# Patient Record
Sex: FEMALE | Race: WHITE | NOT HISPANIC OR LATINO | Employment: OTHER | URBAN - METROPOLITAN AREA
[De-identification: names, ages, dates, MRNs, and addresses within clinical notes are randomized per-mention and may not be internally consistent; named-entity substitution may affect disease eponyms.]

---

## 2017-05-31 ENCOUNTER — HOSPITAL ENCOUNTER (OUTPATIENT)
Dept: OTHER | Facility: HOSPITAL | Age: 32
Discharge: HOME | End: 2017-05-31
Attending: FAMILY MEDICINE | Admitting: FAMILY MEDICINE

## 2017-08-17 ENCOUNTER — CONVERSION ENCOUNTER (OUTPATIENT)
Dept: FAMILY MEDICINE CLINIC | Facility: CLINIC | Age: 32
End: 2017-08-17

## 2017-08-18 LAB
ANA SER QL IA: NEGATIVE
CHROMATIN AB SERPL-ACNC: 7 IU/ML

## 2019-04-26 ENCOUNTER — CONVERSION ENCOUNTER (OUTPATIENT)
Dept: FAMILY MEDICINE CLINIC | Facility: CLINIC | Age: 34
End: 2019-04-26

## 2019-04-26 ENCOUNTER — HOSPITAL ENCOUNTER (OUTPATIENT)
Dept: LAB | Facility: HOSPITAL | Age: 34
Setting detail: SPECIMEN
Discharge: HOME | End: 2019-04-26
Attending: INTERNAL MEDICINE | Admitting: INTERNAL MEDICINE

## 2019-05-23 ENCOUNTER — CONVERSION ENCOUNTER (OUTPATIENT)
Dept: ENDOCRINOLOGY | Facility: CLINIC | Age: 34
End: 2019-05-23

## 2019-06-04 VITALS
HEART RATE: 96 BPM | WEIGHT: 182 LBS | OXYGEN SATURATION: 98 % | BODY MASS INDEX: 32.25 KG/M2 | DIASTOLIC BLOOD PRESSURE: 80 MMHG | SYSTOLIC BLOOD PRESSURE: 110 MMHG | HEIGHT: 63 IN

## 2019-06-04 VITALS
DIASTOLIC BLOOD PRESSURE: 79 MMHG | SYSTOLIC BLOOD PRESSURE: 115 MMHG | HEART RATE: 91 BPM | OXYGEN SATURATION: 99 % | BODY MASS INDEX: 32.43 KG/M2 | WEIGHT: 183 LBS | HEIGHT: 63 IN

## 2019-06-06 NOTE — PROGRESS NOTES
Visit Type:  Follow-up Visit  Referring Provider:  Dr Geronimo   Primary Provider:  none    CC:  thyroid.    History of Present Illness:  33 years old female who presents with thyroid nodule.  She is referred from Dr Savage for hyper thyroid consultation and thyroid nodule.   She has had a history of hyperthyroid for about 2 years but was not able to follow-up due to her job.  She is not   currently taking any thyroid medication.  The patient complains of difficulty swallowing, but denies asymptomatic nodule and localized pain.  Patient denies hoarseness and voice change.    She has a family history of thyroid cancer her paternal   grandmother.    The patient presents with heat intolerance, sweating,mood swings and insomnia and muscle weakness, but denies weight loss, palpitations, tremor, loose bowel movements and visual symptoms.  The patient also notes dry eyes.  Previous evaluation includes lab   studies:, thyroid US: and needle biopsy.      Old records reviewed: Ultrasound 02/2019 showed a right mid lobe thyroid nodule 2.3 x 1.5 cm and left 1.8 x 2.0 cm thyroid nodule biopsy 03/21/2019 left and right were both benign.    labs January 2019 tsh 0.07, free T4 1.3, free T3 3.7.  [facility] PC 5.6 and hemoglobin 15.5, hematocrit 44.1 platelets 311, creatinine 0.75, AST 18 ALT 39.        Standards of Care   no  Last Eye Exam: not sure  Influenza vaccine: 2019  Pneumovax: 2019      Past Medical History:     Reviewed history from 07/25/2016 and no changes required:        Headache, Migraine        Lumbar Disk Disease        Hx of abnormal Paps        thyroid nodule        oropharyngeal dysphagia        eustacian tube dysfunction, bilateral    Past Surgical History:     Reviewed history from 04/26/2019 and no changes required:        Hysterectomy, ovaries remain (2009) for endometriosis        Tubal Ligation        Appendectomy        Cholecystectomy        Carpal Tunnel Right hand         2 nerve release on right  arm    Family History Summary:      Reviewed history Last on 04/26/2019 and no changes required:05/23/2019  MGM - Has Family History of Cervical Cancer - Entered On: 6/25/2015  Mother - Has Family History of Cervical Cancer - Entered On: 6/25/2015  Mother - Has Family History of Diabetes - Entered On: 6/25/2015  Mother - Has Family History of Breast Cancer - Entered On: 6/25/2015    General Comments - FH:  FH Hypertension - Grandmother  FH DM - Grandmother  FH CVA - Grandfather  FH Breast Cancer - Mother    Social History:     Reviewed history from 04/26/2019 and no changes required:                 lucho 2         Employment [employer]         Occupation:         Risk Factors:     Smoked Tobacco Use:  Current every day smoker     Cigarettes:  Yes -- 1/2 pack(s) per day,      Years smoked:  15  Smokeless Tobacco Use:  Never     Counseled to quit/cut down:  yes  Passive smoke exposure:  yes  Drug use:  no  HIV high-risk behavior:  no  Caffeine use:  4+ drinks per day  Alcohol use:  no  Exercise:  no  Seatbelt use:  100 %  Sun Exposure:  occasionally    Family History Risk Factors:     Family History of MI in females < 65 years old:  no     Family History of MI in males < 55 years old:  no    Previous Tobacco Use: Signed On - 04/26/2019  Smoked Tobacco Use:  Current every day smoker     Cigarettes:  Yes -- 1/2 pack(s) per day,      Years smoked:  15  Smokeless Tobacco Use:  Never     Counseled to quit/cut down:  yes  Passive smoke exposure:  yes  Drug use:  no  HIV high-risk behavior:  no  Caffeine use:  4+ drinks per day    Previous Alcohol Use: Signed On - 04/26/2019  Alcohol use:  no  Exercise:  no  Seatbelt use:  100 %  Sun Exposure:  occasionally    Family History Risk Factors:     Family History of MI in females < 65 years old:  no     Family History of MI in males < 55 years old:  no    Current Allergies (reviewed today):  AMOXICILLIN (AMOXICILLIN CAPS) (Critical)  BENADRYL  (Severe)  BICILLIN L-A (PENICILLIN G BENZATHINE) (Moderate)  PENICILLIN G POT IN DEXTROSE (PENICILLIN G POTASSIUM IN D5W) (Moderate)  * LATEX (Moderate)  MORPHINE (Mild)    Current Medications (including medications started today):   METHIMAZOLE 10 MG ORAL TABLET (METHIMAZOLE) one tab daily      Review of Systems     General       Complains of fatigue.       Denies weight loss.    Eyes       Denies double vision and blurring.    CV       Complains of shortness of breath with exertion.       Denies chest pain or discomfort and palpitations.    GI       Denies nausea, vomiting, diarrhea and constipation.           Denies urinary frequency and inability to empty bladder.    MS       Complains of muscle weakness.       Denies muscle cramps and muscle aches.    Derm       Complains of flushing.       Denies dryness, itching and rash.    Neuro       Denies numbness and tingling.    Psych       Denies anxiety and depression.    Endo       Complains of cold intolerance and heat intolerance.      Vital Signs:    Patient Profile:    33 Years Old Female  Height:     62.5 inches  Weight:     182 pounds  BMI:        32.75     O2 Sat:     98 %  Pulse rate: 96 / minute  BP Sittin / 80  (left arm)    Cuff size:  large      Problems: Active problems were reviewed with the patient during this visit.  Medications: Medications were reviewed with the patient during this visit.  Allergies: Allergies were reviewed with the patient during this visit.        Vitals Entered By: Poppy Valadez MA (May 23, 2019 2:39 PM)      Physical Exam    General: Well developed, well nourished, NAD  Eyes: Pink conjunctivae, No ptosis.   Neck: No masses, No thyromegaly, trachea midline.  Lungs: CTA with normal respiratory effort  CV: RRR, no murmur rub or gallop.  Musculoskeletal: Normal gait and station. No digital cyanosis.  Extremities: No clubbing, cyanosis, edema, or deformity.   Neurologic: No focal deficits. Cranial nerves intact.  Skin: Warm  and dry, No lesions or rashes  Psych: AAOx3 with appropraite affect        Blood Pressure:  Today's BP: 110/80 mm Hg          Impression & Recommendations:    Problem # 1:  Hyperthyroidism (ICD-242.90) (BXJ19-O30.90)  Uncontrolled.  1/19 tsh 0.07, free T4 1.3, free T3 3.7.    Repeat TSH 0.07, FT4 1.04, Negative TPO and TSI antibodies.   Uptake 30.2% cold nodule left inferior.    Discussed treatment options including ATD, SARGENT, and surgery.   Side effects of Antithyroid meds d/w her, and possibility  of remission with negative TSI antibodies, and discussed SARGENT in detail.  complications of thyroid surgery including but not limited to   anesthesia risk, infection, pain, bleeding, hypoparathyroidism, hypocalcemia requiring lifelong calcium and vitamin-D supplementation,  damage to the recurrent laryngeal nerve leading to hoarseness, husky voice or loss of voice. She wants to proceed with   surgery, again I had explained to her all the options including radioactive iodine ablation, and thyroid medications and surgery as well as risks and benefit of each option was discussed with the in detail including the complications of surgery as   mentioned above.  She verbalized understanding.  She wants to proceed with surgery.  Will refer to Dr. BIGGS. will add methimazole 10 mg p.o. daily for preop preparation.     Methimazole 10 Mg Oral Tablet (Methimazole) ..... One tab daily    Orders:  [facility] THYROID STIMULATING HORMONE (TSH) (TSH)  [facility] T4 THYROXINE FREE (FT4)  [facility] COMPREHENSIVE METABOLIC PANEL (CMP) (MPC)  [facility] CBC W/DIFF; PATH REVIEW IF INDICATED (CBC)  Surgery Consult (SurgeryOC)  Ofc Vst, Est Level IV (15090)    Her updated medication list for this problem includes:     Methimazole 10 Mg Oral Tablet (Methimazole) ..... One tab daily      Problem # 2:  Thyroid nodule (ICD-241.0) (VLF40-T31.1)  Ultrasound 02/2019 showed a right mid lobe thyroid nodule 2.3 x 1.5 cm and left 1.8 x 2.0 cm thyroid nodule biopsy 03/21/2019 left and  right were both benign.       Orders:  [facility] THYROID STIMULATING HORMONE (TSH) (TSH)  [facility] T4 THYROXINE FREE (FT4)  [facility] COMPREHENSIVE METABOLIC PANEL (CMP) (MPC)  [facility] CBC W/DIFF; PATH REVIEW IF INDICATED (CBC)  Surgery Consult (SurgeryOC)  Ofc Vst, Est Level IV (57458)      Medications Added to Medication List This Visit:  1)  Methimazole 10 Mg Oral Tablet (Methimazole) .... One tab daily      Patient Instructions:  1)  Please schedule a follow-up appointment in 6 weeks.  2)  Be sure to return for lab work one (1) week before your next appointment as scheduled.        Medications:  METHIMAZOLE 10 MG ORAL TABLET (METHIMAZOLE) one tab daily  #30[Tablet] x 3      Entered by: Erin Bernard      Authorized by:  Jeronimo Poe MD      Electronically signed by:   Erin Bernard on 05/23/2019      Method used:    Electronically to               [id number]* (retail)              [address] [address] [place]  [address]              Ph: [phone]              Fax: [phone]      Note to Pharmacy: Route: ORAL;       RxID:   [id number]                Medication Administration    Orders Added:  1)  [facility] THYROID STIMULATING HORMONE (TSH) [TSH]  2)  [facility] T4 THYROXINE FREE [FT4]  3)  [facility] COMPREHENSIVE METABOLIC PANEL (CMP) [MPC]  4)  [facility] CBC W/DIFF; PATH REVIEW IF INDICATED [CBC]  5)  Surgery Consult [SurgeryOC]  6)  Ofc Vst, Est Level IV [71324]  ]      Electronically signed by Jeronimo Poe MD on 05/23/2019 at 3:24 PM  ________________________________________________________________________       Disclaimer: Converted Note message may not contain all data elements that existed in the legacy source system. Please see [employer] for the original note details.

## 2019-06-27 DIAGNOSIS — E04.1 THYROID NODULE: Primary | ICD-10-CM

## 2019-06-27 DIAGNOSIS — E05.90 HYPERTHYROIDISM: ICD-10-CM

## 2019-06-27 PROBLEM — G43.909 HEADACHE, MIGRAINE: Status: ACTIVE | Noted: 2019-06-27

## 2019-06-27 PROBLEM — M54.16 LUMBAR RADICULOPATHY: Status: ACTIVE | Noted: 2019-06-27

## 2019-06-27 PROBLEM — IMO0002 THORACIC OR LUMBOSACRAL NEURITIS OR RADICULITIS: Status: ACTIVE | Noted: 2019-06-27

## 2019-07-08 ENCOUNTER — OFFICE VISIT (OUTPATIENT)
Dept: SURGERY | Facility: CLINIC | Age: 34
End: 2019-07-08

## 2019-07-08 ENCOUNTER — PREP FOR SURGERY (OUTPATIENT)
Dept: OTHER | Facility: HOSPITAL | Age: 34
End: 2019-07-08

## 2019-07-08 VITALS — HEART RATE: 99 BPM | HEIGHT: 63 IN | WEIGHT: 180 LBS | BODY MASS INDEX: 31.89 KG/M2 | OXYGEN SATURATION: 99 %

## 2019-07-08 DIAGNOSIS — E05.90 HYPERTHYROIDISM: ICD-10-CM

## 2019-07-08 DIAGNOSIS — E05.90 HYPERTHYROIDISM: Primary | ICD-10-CM

## 2019-07-08 DIAGNOSIS — E04.1 THYROID NODULE: Primary | ICD-10-CM

## 2019-07-08 PROBLEM — M51.36 DEGENERATION OF LUMBAR INTERVERTEBRAL DISC: Status: ACTIVE | Noted: 2018-12-18

## 2019-07-08 PROBLEM — M51.369 DEGENERATION OF LUMBAR INTERVERTEBRAL DISC: Status: ACTIVE | Noted: 2018-12-18

## 2019-07-08 PROBLEM — M47.816 LUMBAR SPONDYLOSIS: Status: ACTIVE | Noted: 2017-08-31

## 2019-07-08 PROCEDURE — 99243 OFF/OP CNSLTJ NEW/EST LOW 30: CPT | Performed by: SURGERY

## 2019-07-08 RX ORDER — ACETAMINOPHEN 500 MG
1000 TABLET ORAL ONCE
Status: CANCELLED | OUTPATIENT
Start: 2019-07-29 | End: 2019-07-08

## 2019-07-08 RX ORDER — SODIUM CHLORIDE 0.9 % (FLUSH) 0.9 %
3 SYRINGE (ML) INJECTION EVERY 12 HOURS SCHEDULED
Status: CANCELLED | OUTPATIENT
Start: 2019-07-29

## 2019-07-08 RX ORDER — SODIUM CHLORIDE 0.9 % (FLUSH) 0.9 %
3-10 SYRINGE (ML) INJECTION AS NEEDED
Status: CANCELLED | OUTPATIENT
Start: 2019-07-29

## 2019-07-08 RX ORDER — ONDANSETRON 2 MG/ML
8 INJECTION INTRAMUSCULAR; INTRAVENOUS EVERY 6 HOURS PRN
Status: CANCELLED | OUTPATIENT
Start: 2019-07-08

## 2019-07-08 RX ORDER — OXYCODONE HCL 10 MG/1
10 TABLET, FILM COATED, EXTENDED RELEASE ORAL ONCE
Status: CANCELLED | OUTPATIENT
Start: 2019-07-29 | End: 2019-07-08

## 2019-07-08 RX ORDER — CLINDAMYCIN PHOSPHATE 900 MG/50ML
900 INJECTION INTRAVENOUS ONCE
Status: CANCELLED | OUTPATIENT
Start: 2019-07-29 | End: 2019-07-08

## 2019-07-29 ENCOUNTER — APPOINTMENT (OUTPATIENT)
Dept: PREADMISSION TESTING | Facility: HOSPITAL | Age: 34
End: 2019-07-29

## 2019-07-29 VITALS
BODY MASS INDEX: 31.36 KG/M2 | WEIGHT: 177 LBS | HEIGHT: 63 IN | DIASTOLIC BLOOD PRESSURE: 74 MMHG | RESPIRATION RATE: 16 BRPM | SYSTOLIC BLOOD PRESSURE: 109 MMHG | HEART RATE: 91 BPM | OXYGEN SATURATION: 99 % | TEMPERATURE: 98.4 F

## 2019-07-29 DIAGNOSIS — E05.90 HYPERTHYROIDISM: ICD-10-CM

## 2019-07-29 LAB
ABO GROUP BLD: NORMAL
ANION GAP SERPL CALCULATED.3IONS-SCNC: 16.3 MMOL/L (ref 5–15)
BLD GP AB SCN SERPL QL: NEGATIVE
BUN BLD-MCNC: 10 MG/DL (ref 6–20)
BUN/CREAT SERPL: 13.2 (ref 7–25)
CALCIUM SPEC-SCNC: 9.3 MG/DL (ref 8.6–10.5)
CALCIUM SPEC-SCNC: 9.4 MG/DL (ref 8.6–10.5)
CHLORIDE SERPL-SCNC: 105 MMOL/L (ref 98–107)
CO2 SERPL-SCNC: 19.7 MMOL/L (ref 22–29)
CREAT BLD-MCNC: 0.76 MG/DL (ref 0.57–1)
GFR SERPL CREATININE-BSD FRML MDRD: 86 ML/MIN/1.73
GLUCOSE BLD-MCNC: 89 MG/DL (ref 65–99)
POTASSIUM BLD-SCNC: 3.5 MMOL/L (ref 3.5–5.2)
PTH-INTACT SERPL-MCNC: 57.2 PG/ML (ref 15–65)
RH BLD: POSITIVE
SODIUM BLD-SCNC: 141 MMOL/L (ref 136–145)
T&S EXPIRATION DATE: NORMAL

## 2019-07-29 PROCEDURE — 80048 BASIC METABOLIC PNL TOTAL CA: CPT | Performed by: SURGERY

## 2019-07-29 PROCEDURE — 83970 ASSAY OF PARATHORMONE: CPT | Performed by: SURGERY

## 2019-07-29 PROCEDURE — 86850 RBC ANTIBODY SCREEN: CPT | Performed by: SURGERY

## 2019-07-29 PROCEDURE — 86901 BLOOD TYPING SEROLOGIC RH(D): CPT | Performed by: SURGERY

## 2019-07-29 PROCEDURE — 36415 COLL VENOUS BLD VENIPUNCTURE: CPT

## 2019-07-29 PROCEDURE — 86900 BLOOD TYPING SEROLOGIC ABO: CPT | Performed by: SURGERY

## 2019-07-29 NOTE — DISCHARGE INSTRUCTIONS
Take the following medications the morning of surgery with a small sip of water:  NONE    Arrive to hospital on your day of surgery at 6:00 AM.      General Instructions:  • Do not eat solid food after midnight the night before surgery.  • You may drink clear liquids day of surgery but must stop at least one hour before your hospital arrival time.  • It is beneficial for you to have a clear drink that contains carbohydrates the day of surgery.  We suggest a 12 to 20 ounce bottle of Gatorade or Powerade for non-diabetic patients or a 12 to 20 ounce bottle of G2 or Powerade Zero for diabetic patients. (Pediatric patients, are not advised to drink a 12 to 20 ounce carbohydrate drink)    Clear liquids are liquids you can see through.  Nothing red in color.     Plain water                               Sports drinks  Sodas                                   Gelatin (Jell-O)  Fruit juices without pulp such as white grape juice and apple juice  Popsicles that contain no fruit or yogurt  Tea or coffee (no cream or milk added)  Gatorade / Powerade  G2 / Powerade Zero    • Infants may have breast milk up to four hours before surgery.  • Infants drinking formula may drink formula up to six hours before surgery.   • Patients who avoid smoking, chewing tobacco and alcohol for 4 weeks prior to surgery have a reduced risk of post-operative complications.  Quit smoking as many days before surgery as you can.  • Do not smoke, use chewing tobacco or drink alcohol the day of surgery.   • If applicable bring your C-PAP/ BI-PAP machine.  • Bring any papers given to you in the doctor’s office.  • Wear clean comfortable clothes and socks.  • Do not wear contact lenses, false eyelashes or make-up.  Bring a case for your glasses.   • Bring crutches or walker if applicable.  • Remove all piercings.  Leave jewelry and any other valuables at home.  • Hair extensions with metal clips must be removed prior to surgery.  • The Pre-Admission Testing  nurse will instruct you to bring medications if unable to obtain an accurate list in Pre-Admission Testing.        If you were given a blood bank ID arm band remember to bring it with you the day of surgery.    Preventing a Surgical Site Infection:  • For 2 to 3 days before surgery, avoid shaving with a razor because the razor can irritate skin and make it easier to develop an infection.    • Any areas of open skin can increase the risk of a post-operative wound infection by allowing bacteria to enter and travel throughout the body.  Notify your surgeon if you have any skin wounds / rashes even if it is not near the expected surgical site.  The area will need assessed to determine if surgery should be delayed until it is healed.  • The night prior to surgery sleep in a clean bed with clean clothing.  Do not allow pets to sleep with you.  • Shower on the morning of surgery using a fresh bar of anti-bacterial soap (such as Dial) and clean washcloth.  Dry with a clean towel and dress in clean clothing.  • Ask your surgeon if you will be receiving antibiotics prior to surgery.  • Make sure you, your family, and all healthcare providers clean their hands with soap and water or an alcohol based hand  before caring for you or your wound.    Day of surgery:  Upon arrival, a Pre-op nurse and Anesthesiologist will review your health history, obtain vital signs, and answer questions you may have.  The only belongings needed at this time will be a list of your home medications and if applicable your C-PAP/BI-PAP machine.  If you are staying overnight your family can leave the rest of your belongings in the car and bring them to your room later.  A Pre-op nurse will start an IV and you may receive medication in preparation for surgery, including something to help you relax.  Your family will be able to see you in the Pre-op area.  While you are in surgery your family should notify the waiting room  if they  leave the waiting room area and provide a contact phone number.    Please be aware that surgery does come with discomfort.  We want to make every effort to control your discomfort so please discuss any uncontrolled symptoms with your nurse.   Your doctor will most likely have prescribed pain medications.      If you are going home after surgery you will receive individualized written care instructions before being discharged.  A responsible adult must drive you to and from the hospital on the day of your surgery and stay with you for 24 hours.    If you are staying overnight following surgery, you will be transported to your hospital room following the recovery period.  [facility] has all private rooms.    You have received a list of surgical assistants for your reference.  If you have any questions please call Pre-Admission Testing at [phone].  Deductibles and co-payments are collected on the day of service. Please be prepared to pay the required co-pay, deductible or deposit on the day of service as defined by your plan.    2% CHLORAHEXIDINE GLUCONATE* CLOTH  Preparing or “prepping” skin before surgery can reduce the risk of infection at the surgical site. To make the process easier, [facility] has chosen disposable cloths moistened with a rinse-free, 2% Chlorhexidine Gluconate (CHG) antiseptic solution. The steps below outline the prepping process and should be carefully followed.        Use the prep cloth on the area that is circled in the diagram             Directions Night before Surgery  1) Shower using a fresh bar of anti-bacterial soap (such as Dial) and clean washcloth.  Use a clean towel to completely dry your skin.  2) Do not use any lotions, oils or creams on your skin.  3) Open the package and remove 1 cloth, wipe your skin for 30 seconds in a circular motion.  Allow to dry for 3 minutes.  4) Repeat #3 with second cloth.  5) Do not touch your eyes, ears, or mouth with  the prep cloth.  6) Allow the wet prep solution to air dry.  7) Discard the prep cloth and wash your hands with soap and water.   8) Dress in clean bed clothes and sleep on fresh clean bed sheets.   9) You may experience some temporary itching after the prep.    Directions Day of Surgery  1) Repeat steps 1,2,3,4,5,6,7, and 9.   2) Dress in clean clothes before coming to the hospital.

## 2019-08-07 ENCOUNTER — ANESTHESIA (OUTPATIENT)
Dept: PERIOP | Facility: HOSPITAL | Age: 34
End: 2019-08-07

## 2019-08-07 ENCOUNTER — HOSPITAL ENCOUNTER (OUTPATIENT)
Facility: HOSPITAL | Age: 34
Discharge: HOME | End: 2019-08-08
Attending: SURGERY | Admitting: SURGERY

## 2019-08-07 ENCOUNTER — ANESTHESIA EVENT (OUTPATIENT)
Dept: PERIOP | Facility: HOSPITAL | Age: 34
End: 2019-08-07

## 2019-08-07 DIAGNOSIS — E05.90 HYPERTHYROIDISM: ICD-10-CM

## 2019-08-07 LAB
PTH-INTACT SERPL-SCNC: 67.9 PG/ML (ref 15–65)
TSH SERPL DL<=0.05 MIU/L-ACNC: 0.4 MIU/ML (ref 0.27–4.2)

## 2019-08-07 PROCEDURE — 88307 TISSUE EXAM BY PATHOLOGIST: CPT | Performed by: SURGERY

## 2019-08-07 PROCEDURE — 25010000002 HYDROMORPHONE PER 4 MG: Performed by: NURSE ANESTHETIST, CERTIFIED REGISTERED

## 2019-08-07 PROCEDURE — 60240 REMOVAL OF THYROID: CPT | Performed by: PHYSICIAN ASSISTANT

## 2019-08-07 PROCEDURE — 25010000002 PROPOFOL 10 MG/ML EMULSION: Performed by: NURSE ANESTHETIST, CERTIFIED REGISTERED

## 2019-08-07 PROCEDURE — 25010000002 FENTANYL CITRATE (PF) 100 MCG/2ML SOLUTION: Performed by: NURSE ANESTHETIST, CERTIFIED REGISTERED

## 2019-08-07 PROCEDURE — 60240 REMOVAL OF THYROID: CPT | Performed by: SURGERY

## 2019-08-07 PROCEDURE — 83970 ASSAY OF PARATHORMONE: CPT | Performed by: SURGERY

## 2019-08-07 PROCEDURE — 25010000002 PROMETHAZINE PER 50 MG: Performed by: SURGERY

## 2019-08-07 PROCEDURE — 84443 ASSAY THYROID STIM HORMONE: CPT | Performed by: SURGERY

## 2019-08-07 PROCEDURE — 25010000002 ONDANSETRON PER 1 MG: Performed by: SURGERY

## 2019-08-07 PROCEDURE — 25010000002 ONDANSETRON PER 1 MG: Performed by: NURSE ANESTHETIST, CERTIFIED REGISTERED

## 2019-08-07 PROCEDURE — 25010000002 DEXAMETHASONE PER 1 MG: Performed by: NURSE ANESTHETIST, CERTIFIED REGISTERED

## 2019-08-07 PROCEDURE — 25010000002 MIDAZOLAM PER 1 MG: Performed by: ANESTHESIOLOGY

## 2019-08-07 DEVICE — CLIP LIGAT VASC HORIZON TI SM/WD RED 24CT: Type: IMPLANTABLE DEVICE | Site: NECK | Status: FUNCTIONAL

## 2019-08-07 DEVICE — CLIP LIGAT VASC HORIZON TI MD BLU 24CT: Type: IMPLANTABLE DEVICE | Site: NECK | Status: FUNCTIONAL

## 2019-08-07 RX ORDER — SODIUM CHLORIDE 0.9 % (FLUSH) 0.9 %
3-10 SYRINGE (ML) INJECTION AS NEEDED
Status: DISCONTINUED | OUTPATIENT
Start: 2019-08-07 | End: 2019-08-07 | Stop reason: HOSPADM

## 2019-08-07 RX ORDER — MIDAZOLAM HYDROCHLORIDE 1 MG/ML
1 INJECTION INTRAMUSCULAR; INTRAVENOUS
Status: DISCONTINUED | OUTPATIENT
Start: 2019-08-07 | End: 2019-08-07 | Stop reason: HOSPADM

## 2019-08-07 RX ORDER — PROMETHAZINE HYDROCHLORIDE 25 MG/1
25 SUPPOSITORY RECTAL ONCE AS NEEDED
Status: DISCONTINUED | OUTPATIENT
Start: 2019-08-07 | End: 2019-08-07 | Stop reason: HOSPADM

## 2019-08-07 RX ORDER — MEPERIDINE HYDROCHLORIDE 25 MG/ML
12.5 INJECTION INTRAMUSCULAR; INTRAVENOUS; SUBCUTANEOUS
Status: DISCONTINUED | OUTPATIENT
Start: 2019-08-07 | End: 2019-08-07 | Stop reason: HOSPADM

## 2019-08-07 RX ORDER — CLINDAMYCIN PHOSPHATE 900 MG/50ML
900 INJECTION INTRAVENOUS ONCE
Status: COMPLETED | OUTPATIENT
Start: 2019-08-07 | End: 2019-08-07

## 2019-08-07 RX ORDER — PROMETHAZINE HYDROCHLORIDE 25 MG/ML
12.5 INJECTION, SOLUTION INTRAMUSCULAR; INTRAVENOUS ONCE AS NEEDED
Status: DISCONTINUED | OUTPATIENT
Start: 2019-08-07 | End: 2019-08-07 | Stop reason: HOSPADM

## 2019-08-07 RX ORDER — PROPOFOL 10 MG/ML
VIAL (ML) INTRAVENOUS AS NEEDED
Status: DISCONTINUED | OUTPATIENT
Start: 2019-08-07 | End: 2019-08-07 | Stop reason: SURG

## 2019-08-07 RX ORDER — ACETAMINOPHEN 500 MG
1000 TABLET ORAL ONCE
Status: COMPLETED | OUTPATIENT
Start: 2019-08-07 | End: 2019-08-07

## 2019-08-07 RX ORDER — PROMETHAZINE HYDROCHLORIDE 25 MG/ML
12.5 INJECTION, SOLUTION INTRAMUSCULAR; INTRAVENOUS EVERY 6 HOURS PRN
Status: DISCONTINUED | OUTPATIENT
Start: 2019-08-07 | End: 2019-08-08 | Stop reason: HOSPADM

## 2019-08-07 RX ORDER — ACETAMINOPHEN 325 MG/1
650 TABLET ORAL ONCE AS NEEDED
Status: DISCONTINUED | OUTPATIENT
Start: 2019-08-07 | End: 2019-08-07 | Stop reason: HOSPADM

## 2019-08-07 RX ORDER — ACETAMINOPHEN 650 MG/1
650 SUPPOSITORY RECTAL EVERY 4 HOURS PRN
Status: DISCONTINUED | OUTPATIENT
Start: 2019-08-07 | End: 2019-08-08 | Stop reason: HOSPADM

## 2019-08-07 RX ORDER — NALOXONE HCL 0.4 MG/ML
0.1 VIAL (ML) INJECTION
Status: DISCONTINUED | OUTPATIENT
Start: 2019-08-07 | End: 2019-08-08 | Stop reason: HOSPADM

## 2019-08-07 RX ORDER — FENTANYL CITRATE 50 UG/ML
INJECTION, SOLUTION INTRAMUSCULAR; INTRAVENOUS AS NEEDED
Status: DISCONTINUED | OUTPATIENT
Start: 2019-08-07 | End: 2019-08-07 | Stop reason: SURG

## 2019-08-07 RX ORDER — ACETAMINOPHEN 325 MG/1
650 TABLET ORAL EVERY 4 HOURS PRN
Status: DISCONTINUED | OUTPATIENT
Start: 2019-08-07 | End: 2019-08-08 | Stop reason: HOSPADM

## 2019-08-07 RX ORDER — CHLORHEXIDINE GLUCONATE 4 G/100ML
SOLUTION TOPICAL DAILY PRN
COMMUNITY
End: 2019-08-08 | Stop reason: HOSPADM

## 2019-08-07 RX ORDER — ONDANSETRON 2 MG/ML
4 INJECTION INTRAMUSCULAR; INTRAVENOUS ONCE AS NEEDED
Status: DISCONTINUED | OUTPATIENT
Start: 2019-08-07 | End: 2019-08-07 | Stop reason: HOSPADM

## 2019-08-07 RX ORDER — ACETAMINOPHEN 160 MG/5ML
650 SOLUTION ORAL EVERY 4 HOURS PRN
Status: DISCONTINUED | OUTPATIENT
Start: 2019-08-07 | End: 2019-08-08 | Stop reason: HOSPADM

## 2019-08-07 RX ORDER — SODIUM CHLORIDE 0.9 % (FLUSH) 0.9 %
3 SYRINGE (ML) INJECTION EVERY 12 HOURS SCHEDULED
Status: DISCONTINUED | OUTPATIENT
Start: 2019-08-07 | End: 2019-08-07 | Stop reason: HOSPADM

## 2019-08-07 RX ORDER — PROMETHAZINE HYDROCHLORIDE 25 MG/ML
6.25 INJECTION, SOLUTION INTRAMUSCULAR; INTRAVENOUS
Status: DISCONTINUED | OUTPATIENT
Start: 2019-08-07 | End: 2019-08-07 | Stop reason: HOSPADM

## 2019-08-07 RX ORDER — ONDANSETRON 2 MG/ML
INJECTION INTRAMUSCULAR; INTRAVENOUS AS NEEDED
Status: DISCONTINUED | OUTPATIENT
Start: 2019-08-07 | End: 2019-08-07 | Stop reason: SURG

## 2019-08-07 RX ORDER — HYDROMORPHONE HYDROCHLORIDE 1 MG/ML
0.5 INJECTION, SOLUTION INTRAMUSCULAR; INTRAVENOUS; SUBCUTANEOUS
Status: DISCONTINUED | OUTPATIENT
Start: 2019-08-07 | End: 2019-08-07 | Stop reason: HOSPADM

## 2019-08-07 RX ORDER — FENTANYL CITRATE 50 UG/ML
50 INJECTION, SOLUTION INTRAMUSCULAR; INTRAVENOUS
Status: DISCONTINUED | OUTPATIENT
Start: 2019-08-07 | End: 2019-08-07 | Stop reason: HOSPADM

## 2019-08-07 RX ORDER — MAGNESIUM HYDROXIDE 1200 MG/15ML
LIQUID ORAL AS NEEDED
Status: DISCONTINUED | OUTPATIENT
Start: 2019-08-07 | End: 2019-08-07 | Stop reason: HOSPADM

## 2019-08-07 RX ORDER — ALBUTEROL SULFATE 2.5 MG/3ML
2.5 SOLUTION RESPIRATORY (INHALATION) ONCE AS NEEDED
Status: DISCONTINUED | OUTPATIENT
Start: 2019-08-07 | End: 2019-08-07 | Stop reason: HOSPADM

## 2019-08-07 RX ORDER — HYDROMORPHONE HYDROCHLORIDE 1 MG/ML
0.5 INJECTION, SOLUTION INTRAMUSCULAR; INTRAVENOUS; SUBCUTANEOUS
Status: DISCONTINUED | OUTPATIENT
Start: 2019-08-07 | End: 2019-08-08 | Stop reason: HOSPADM

## 2019-08-07 RX ORDER — OXYCODONE AND ACETAMINOPHEN 10; 325 MG/1; MG/1
1 TABLET ORAL EVERY 4 HOURS PRN
Status: DISCONTINUED | OUTPATIENT
Start: 2019-08-07 | End: 2019-08-08 | Stop reason: HOSPADM

## 2019-08-07 RX ORDER — DEXAMETHASONE SODIUM PHOSPHATE 10 MG/ML
INJECTION INTRAMUSCULAR; INTRAVENOUS AS NEEDED
Status: DISCONTINUED | OUTPATIENT
Start: 2019-08-07 | End: 2019-08-07 | Stop reason: SURG

## 2019-08-07 RX ORDER — LABETALOL HYDROCHLORIDE 5 MG/ML
5 INJECTION, SOLUTION INTRAVENOUS
Status: DISCONTINUED | OUTPATIENT
Start: 2019-08-07 | End: 2019-08-07 | Stop reason: HOSPADM

## 2019-08-07 RX ORDER — HYDRALAZINE HYDROCHLORIDE 20 MG/ML
5 INJECTION INTRAMUSCULAR; INTRAVENOUS
Status: DISCONTINUED | OUTPATIENT
Start: 2019-08-07 | End: 2019-08-07 | Stop reason: HOSPADM

## 2019-08-07 RX ORDER — HYDROCODONE BITARTRATE AND ACETAMINOPHEN 7.5; 325 MG/1; MG/1
1 TABLET ORAL ONCE AS NEEDED
Status: DISCONTINUED | OUTPATIENT
Start: 2019-08-07 | End: 2019-08-07 | Stop reason: HOSPADM

## 2019-08-07 RX ORDER — MIDAZOLAM HYDROCHLORIDE 1 MG/ML
2 INJECTION INTRAMUSCULAR; INTRAVENOUS
Status: DISCONTINUED | OUTPATIENT
Start: 2019-08-07 | End: 2019-08-07 | Stop reason: HOSPADM

## 2019-08-07 RX ORDER — FLUMAZENIL 0.1 MG/ML
0.2 INJECTION INTRAVENOUS AS NEEDED
Status: DISCONTINUED | OUTPATIENT
Start: 2019-08-07 | End: 2019-08-07 | Stop reason: HOSPADM

## 2019-08-07 RX ORDER — BUPIVACAINE HYDROCHLORIDE AND EPINEPHRINE 5; 5 MG/ML; UG/ML
INJECTION, SOLUTION PERINEURAL AS NEEDED
Status: DISCONTINUED | OUTPATIENT
Start: 2019-08-07 | End: 2019-08-07 | Stop reason: HOSPADM

## 2019-08-07 RX ORDER — ONDANSETRON 4 MG/1
4 TABLET, FILM COATED ORAL EVERY 6 HOURS PRN
Status: DISCONTINUED | OUTPATIENT
Start: 2019-08-07 | End: 2019-08-08 | Stop reason: HOSPADM

## 2019-08-07 RX ORDER — LIDOCAINE HYDROCHLORIDE 20 MG/ML
INJECTION, SOLUTION INFILTRATION; PERINEURAL AS NEEDED
Status: DISCONTINUED | OUTPATIENT
Start: 2019-08-07 | End: 2019-08-07 | Stop reason: SURG

## 2019-08-07 RX ORDER — ROCURONIUM BROMIDE 10 MG/ML
INJECTION, SOLUTION INTRAVENOUS AS NEEDED
Status: DISCONTINUED | OUTPATIENT
Start: 2019-08-07 | End: 2019-08-07 | Stop reason: SURG

## 2019-08-07 RX ORDER — FAMOTIDINE 10 MG/ML
20 INJECTION, SOLUTION INTRAVENOUS ONCE
Status: COMPLETED | OUTPATIENT
Start: 2019-08-07 | End: 2019-08-07

## 2019-08-07 RX ORDER — NALOXONE HCL 0.4 MG/ML
0.2 VIAL (ML) INJECTION AS NEEDED
Status: DISCONTINUED | OUTPATIENT
Start: 2019-08-07 | End: 2019-08-07 | Stop reason: HOSPADM

## 2019-08-07 RX ORDER — SODIUM CHLORIDE, SODIUM LACTATE, POTASSIUM CHLORIDE, CALCIUM CHLORIDE 600; 310; 30; 20 MG/100ML; MG/100ML; MG/100ML; MG/100ML
100 INJECTION, SOLUTION INTRAVENOUS CONTINUOUS
Status: DISCONTINUED | OUTPATIENT
Start: 2019-08-07 | End: 2019-08-08 | Stop reason: HOSPADM

## 2019-08-07 RX ORDER — HYDROMORPHONE HCL 110MG/55ML
PATIENT CONTROLLED ANALGESIA SYRINGE INTRAVENOUS AS NEEDED
Status: DISCONTINUED | OUTPATIENT
Start: 2019-08-07 | End: 2019-08-07 | Stop reason: SURG

## 2019-08-07 RX ORDER — OXYCODONE HCL 10 MG/1
10 TABLET, FILM COATED, EXTENDED RELEASE ORAL ONCE
Status: COMPLETED | OUTPATIENT
Start: 2019-08-07 | End: 2019-08-07

## 2019-08-07 RX ORDER — HYDROCODONE BITARTRATE AND ACETAMINOPHEN 5; 325 MG/1; MG/1
1 TABLET ORAL EVERY 4 HOURS PRN
Status: DISCONTINUED | OUTPATIENT
Start: 2019-08-07 | End: 2019-08-08 | Stop reason: HOSPADM

## 2019-08-07 RX ORDER — ONDANSETRON 2 MG/ML
4 INJECTION INTRAMUSCULAR; INTRAVENOUS EVERY 6 HOURS PRN
Status: DISCONTINUED | OUTPATIENT
Start: 2019-08-07 | End: 2019-08-08 | Stop reason: HOSPADM

## 2019-08-07 RX ORDER — SODIUM CHLORIDE, SODIUM LACTATE, POTASSIUM CHLORIDE, CALCIUM CHLORIDE 600; 310; 30; 20 MG/100ML; MG/100ML; MG/100ML; MG/100ML
9 INJECTION, SOLUTION INTRAVENOUS CONTINUOUS
Status: DISCONTINUED | OUTPATIENT
Start: 2019-08-07 | End: 2019-08-07

## 2019-08-07 RX ORDER — PROMETHAZINE HYDROCHLORIDE 25 MG/1
25 TABLET ORAL ONCE AS NEEDED
Status: DISCONTINUED | OUTPATIENT
Start: 2019-08-07 | End: 2019-08-07 | Stop reason: HOSPADM

## 2019-08-07 RX ADMIN — FENTANYL CITRATE 50 MCG: 50 INJECTION, SOLUTION INTRAMUSCULAR; INTRAVENOUS at 10:30

## 2019-08-07 RX ADMIN — ONDANSETRON 4 MG: 2 INJECTION INTRAMUSCULAR; INTRAVENOUS at 09:29

## 2019-08-07 RX ADMIN — SODIUM CHLORIDE, POTASSIUM CHLORIDE, SODIUM LACTATE AND CALCIUM CHLORIDE: 600; 310; 30; 20 INJECTION, SOLUTION INTRAVENOUS at 09:44

## 2019-08-07 RX ADMIN — HYDROCODONE BITARTRATE AND ACETAMINOPHEN 1 TABLET: 5; 325 TABLET ORAL at 20:46

## 2019-08-07 RX ADMIN — FENTANYL CITRATE 100 MCG: 50 INJECTION INTRAMUSCULAR; INTRAVENOUS at 08:07

## 2019-08-07 RX ADMIN — PROMETHAZINE HYDROCHLORIDE 12.5 MG: 25 INJECTION INTRAMUSCULAR; INTRAVENOUS at 17:02

## 2019-08-07 RX ADMIN — HYDROMORPHONE HYDROCHLORIDE 0.5 MG: 1 INJECTION, SOLUTION INTRAMUSCULAR; INTRAVENOUS; SUBCUTANEOUS at 11:32

## 2019-08-07 RX ADMIN — CALCIUM CARBONATE-VITAMIN D TAB 500 MG-200 UNIT 1000 MG: 500-200 TAB at 20:05

## 2019-08-07 RX ADMIN — CLINDAMYCIN PHOSPHATE 900 MG: 900 INJECTION INTRAVENOUS at 08:21

## 2019-08-07 RX ADMIN — OXYCODONE HYDROCHLORIDE 10 MG: 10 TABLET, FILM COATED, EXTENDED RELEASE ORAL at 06:25

## 2019-08-07 RX ADMIN — FENTANYL CITRATE 50 MCG: 50 INJECTION INTRAMUSCULAR; INTRAVENOUS at 08:50

## 2019-08-07 RX ADMIN — DEXAMETHASONE SODIUM PHOSPHATE 8 MG: 10 INJECTION INTRAMUSCULAR; INTRAVENOUS at 08:20

## 2019-08-07 RX ADMIN — ACETAMINOPHEN 1000 MG: 500 TABLET, FILM COATED ORAL at 06:25

## 2019-08-07 RX ADMIN — LIDOCAINE HYDROCHLORIDE 100 MG: 20 INJECTION, SOLUTION INFILTRATION; PERINEURAL at 08:07

## 2019-08-07 RX ADMIN — HYDROMORPHONE HYDROCHLORIDE 0.5 MG: 1 INJECTION, SOLUTION INTRAMUSCULAR; INTRAVENOUS; SUBCUTANEOUS at 12:05

## 2019-08-07 RX ADMIN — FENTANYL CITRATE 50 MCG: 50 INJECTION, SOLUTION INTRAMUSCULAR; INTRAVENOUS at 11:10

## 2019-08-07 RX ADMIN — HYDROMORPHONE HYDROCHLORIDE 1 MG: 2 INJECTION INTRAMUSCULAR; INTRAVENOUS; SUBCUTANEOUS at 10:00

## 2019-08-07 RX ADMIN — MIDAZOLAM 2 MG: 1 INJECTION INTRAMUSCULAR; INTRAVENOUS at 07:12

## 2019-08-07 RX ADMIN — SODIUM CHLORIDE, POTASSIUM CHLORIDE, SODIUM LACTATE AND CALCIUM CHLORIDE 9 ML/HR: 600; 310; 30; 20 INJECTION, SOLUTION INTRAVENOUS at 06:13

## 2019-08-07 RX ADMIN — FAMOTIDINE 20 MG: 10 INJECTION INTRAVENOUS at 07:12

## 2019-08-07 RX ADMIN — ROCURONIUM BROMIDE 40 MG: 10 INJECTION INTRAVENOUS at 08:07

## 2019-08-07 RX ADMIN — PROPOFOL 200 MG: 10 INJECTION, EMULSION INTRAVENOUS at 08:07

## 2019-08-07 RX ADMIN — ONDANSETRON 4 MG: 2 INJECTION INTRAMUSCULAR; INTRAVENOUS at 13:22

## 2019-08-07 NOTE — OP NOTE
Operative Note  Thyroid  08/07/19      Pre-op Diagnosis:   · Hyperthyroidism, with bilateral thyroid nodules, FNA benign, on tapazole  · BMI 32  · Smoker  · Expected post op calcium drop    Post-op Diagnosis:  · same    Procedure:   · Total thyroidectomy    Surgeon: Kandy    Assistant: shabnam    Indications:  · Nice 34 year old patient of Dr Poe with bilateral thyroid nodules and hyperthyroidism    Associated Issues:  · She was advised to stop smoking pre op but opted not to and smoked the morning of the procedure.  She had been notified of the increased risks of coughing post op with subsequent bleed and wound  Healing difficulties.  · Pre op calcium: 9.3  · Pre op PTH: 57.2    Findings:   · Intraoperative STAT PTH preoperatively 67.2  · Gross findings:  No abnormalities grossly    Recommendations:   · Overnight stay    EBL: <50 ml    Technique:     General anesthetic was induced.  IV cleocin was given.  The neck was extended, and then prepped with Hibiclens and draped sterilely.    The neck was examined and the skin creases evaluated to determine the best location for the incision, attempting to maximize both operative exposure and post op cosmesis.  I selected a low location, with a fairly good crease on the right, not so much on the left, and marked.  She had another that was quite superior that was more dramatic, but I felt like this was too high.    1/2% Marcaine with epinephrine was used to inject the site.  Incision was made thru the skin and subcutaneous space and then completed with cautery to the cervical fascia.  Flaps were then raised with the facelift retractors and cautery, directly on the anterior aspect of the fascia, both superiorly and inferiorly.  The strap muscles were then divided in the midline.    Dissection was then begun under the strap muscle on the left side.  The bipolar on 15 was used.  Her thyroid tended to rotate back posteriorly fairly dramatically.  It also was fairly high or  cephalad in its location.  I dissected out the inferior pole, took down the diminutive vessel with a small clip and divided.  As I came up on the lateral aspect, identified a parathyroid and pressed that backwards.  Identified the nerve deeply, and it had a very tiny attachment that went around the nerve up to the thyroid that I basically just cut off to allow removal, and it ultimately quit bleeding on its own.  This was only one branch of the nerve, a larger branch being more posterior.  I then teased the remaining portion of the nerve off of the posterior aspect of the thyroid, it being extremely tethered.  I then continued up to the superior pole, dissected out, placed large clips across it, and divided with the bipolar.  I then teased the superior portion more medially off of the cricopharyngeus, identified the nerve and pulled the thyroid towards the midline.  This completely loosened it.    I went to the opposite side and did similarly.  On the right side, the superior pole went up considerably more superiorly and had to place 3 separate sets of clips for bundles going to the thyroid.  The nerve on the right was considerably larger.  It was easily identified and avoided.  A large vessel came around it was clipped quite far off of it up towards the thyroid.  The inferior pole had a more obvious vasculature as well and was clipped with large clips.  Both parathyroids were identified and pulled back laterally.  Once both sides were completely loose, I took it off the central portion with the bipolar.    We examined both sides and the nerve was intact on both sides, 2 parathyroids with Definity on the right, one on the left.  I suspected that the inferior parathyroid on the left was in some fatty tissue which had not been disturbed at the more inferior aspect of the neck.  I had stayed well off the cricopharyngeus, do my dissection right on the thyroid to avoid injury to the superior laryngeal nerve.    I  irrigated both sides and suctioned to make sure that all was clear, and used the cautery on the clips at the superior aspect on the right..  Surgicel was placed.    The wound was then closed with a running 3-0 Vicryl to the midline strap muscles, followed by interrupted 3-0 vicryl to the platysma, interrupted 3-0 vicryl to the subcutaneous, and running 5-0 vicryl to the skin.  Dermabond was applied to the wound.    Court Gaitan MD  08/07/19  10:21 AM

## 2019-08-07 NOTE — ANESTHESIA PREPROCEDURE EVALUATION
Anesthesia Evaluation     no history of anesthetic complications:               Airway   Mallampati: I  TM distance: >3 FB  Neck ROM: full  Dental    (+) upper dentures    Pulmonary    (+) a smoker Current Smoked day of surgery,   (-) asthma, shortness of breath, recent URI  Cardiovascular     (-) hypertension      Neuro/Psych  GI/Hepatic/Renal/Endo    (-) liver disease, no renal disease, diabetes    Musculoskeletal     Abdominal    Substance History      OB/GYN          Other                        Anesthesia Plan    ASA 2     general     intravenous induction   Anesthetic plan, all risks, benefits, and alternatives have been provided, discussed and informed consent has been obtained with: patient.

## 2019-08-07 NOTE — ANESTHESIA POSTPROCEDURE EVALUATION
"Patient: Iva Cameron    Procedure Summary     Date:  08/07/19 Room / Location:  [facility] OR 02 / [facility] MAIN OR    Anesthesia Start:  0805 Anesthesia Stop:  1003    Procedure:  total thyroidectomy (Bilateral Neck) Diagnosis:       Hyperthyroidism      (Hyperthyroidism [E05.90])    Surgeon:  Court Gaitan MD Provider:  Fiona Nick MD    Anesthesia Type:  general ASA Status:  2          Anesthesia Type: general  Last vitals  BP   141/87 (08/07/19 1130)   Temp   36.8 °C (98.3 °F) (08/07/19 1000)   Pulse   71 (08/07/19 1130)   Resp   14 (08/07/19 1130)     SpO2   100 % (08/07/19 1130)     Post Anesthesia Care and Evaluation    Patient location during evaluation: bedside  Patient participation: complete - patient participated  Level of consciousness: sleepy but conscious  Pain score: 0  Pain management: adequate  Airway patency: patent  Anesthetic complications: No anesthetic complications    Cardiovascular status: acceptable  Respiratory status: acceptable  Hydration status: acceptable    Comments: /87   Pulse 71   Temp 36.8 °C (98.3 °F) (Oral)   Resp 14   Ht 160 cm (63\")   Wt 79.4 kg (175 lb 2 oz)   SpO2 100%   BMI 31.02 kg/m²         "

## 2019-08-07 NOTE — ANESTHESIA PROCEDURE NOTES
Airway  Urgency: elective    Airway not difficult    General Information and Staff    Patient location during procedure: OR  Anesthesiologist: Fiona Nick MD  CRNA: Tamy Peraza CRNA    Indications and Patient Condition  Indications for airway management: airway protection    Preoxygenated: yes (pt pre-O2 with 100% O2)  Mask difficulty assessment: 2 - vent by mask + OA or adjuvant +/- NMBA (easy BMV )    Final Airway Details  Final airway type: endotracheal airway      Successful airway: ETT  Cuffed: yes   Successful intubation technique: direct laryngoscopy  Endotracheal tube insertion site: oral  Blade: Nery  Blade size: 3  ETT size (mm): 7.0  Cormack-Lehane Classification: grade I - full view of glottis  Placement verified by: chest auscultation and capnometry   Cuff volume (mL): 7  Measured from: lips  ETT to lips (cm): 21  Number of attempts at approach: 1    Additional Comments  Patient upper dentures removed after induction and before DL and placed in a denture cup. ATOETx1. No change in dentition.

## 2019-08-07 NOTE — ANESTHESIA POSTPROCEDURE EVALUATION
"Patient: Iva Cameron    Procedure Summary     Date:  08/07/19 Room / Location:  [facility] OR 02 / [facility] MAIN OR    Anesthesia Start:  0805 Anesthesia Stop:  1003    Procedure:  total thyroidectomy (Bilateral Neck) Diagnosis:       Hyperthyroidism      (Hyperthyroidism [E05.90])    Surgeon:  Court Gaitan MD Provider:  Fiona Nick MD    Anesthesia Type:  general ASA Status:  2          Anesthesia Type: general  Last vitals  BP   109/73 (08/07/19 1245)   Temp   36.9 °C (98.4 °F) (08/07/19 1245)   Pulse   67 (08/07/19 1245)   Resp   16 (08/07/19 1245)     SpO2   96 % (08/07/19 1245)     Post Anesthesia Care and Evaluation    Patient location during evaluation: bedside  Patient participation: complete - patient participated  Level of consciousness: awake and alert  Pain management: adequate  Airway patency: patent  Anesthetic complications: No anesthetic complications    Cardiovascular status: acceptable  Respiratory status: acceptable  Hydration status: acceptable    Comments: /73   Pulse 67   Temp 36.9 °C (98.4 °F) (Oral)   Resp 16   Ht 160 cm (63\")   Wt 79.4 kg (175 lb 2 oz)   SpO2 96%   BMI 31.02 kg/m²       "

## 2019-08-08 VITALS
OXYGEN SATURATION: 100 % | RESPIRATION RATE: 18 BRPM | SYSTOLIC BLOOD PRESSURE: 111 MMHG | HEIGHT: 63 IN | BODY MASS INDEX: 31.03 KG/M2 | WEIGHT: 175.13 LBS | HEART RATE: 75 BPM | TEMPERATURE: 98.2 F | DIASTOLIC BLOOD PRESSURE: 76 MMHG

## 2019-08-08 LAB
CALCIUM SPEC-SCNC: 8.8 MG/DL (ref 8.6–10.5)
PTH-INTACT SERPL-MCNC: 20.9 PG/ML (ref 15–65)

## 2019-08-08 PROCEDURE — 82310 ASSAY OF CALCIUM: CPT | Performed by: SURGERY

## 2019-08-08 PROCEDURE — 83970 ASSAY OF PARATHORMONE: CPT | Performed by: SURGERY

## 2019-08-08 RX ORDER — HYDROCODONE BITARTRATE AND ACETAMINOPHEN 5; 325 MG/1; MG/1
1 TABLET ORAL EVERY 4 HOURS PRN
Qty: 16 TABLET | Refills: 0 | Status: SHIPPED | OUTPATIENT
Start: 2019-08-08 | End: 2019-08-14

## 2019-08-08 RX ORDER — ONDANSETRON 4 MG/1
4 TABLET, FILM COATED ORAL EVERY 8 HOURS PRN
Qty: 20 TABLET | Refills: 0 | Status: SHIPPED | OUTPATIENT
Start: 2019-08-08 | End: 2019-08-14

## 2019-08-08 RX ORDER — LEVOTHYROXINE SODIUM 0.07 MG/1
75 TABLET ORAL DAILY
Qty: 30 TABLET | Refills: 11 | Status: SHIPPED | OUTPATIENT
Start: 2019-08-08 | End: 2019-08-12

## 2019-08-08 RX ADMIN — CALCIUM CARBONATE-VITAMIN D TAB 500 MG-200 UNIT 1000 MG: 500-200 TAB at 08:37

## 2019-08-08 RX ADMIN — HYDROCODONE BITARTRATE AND ACETAMINOPHEN 1 TABLET: 5; 325 TABLET ORAL at 09:04

## 2019-08-08 RX ADMIN — HYDROCODONE BITARTRATE AND ACETAMINOPHEN 1 TABLET: 5; 325 TABLET ORAL at 04:47

## 2019-08-08 NOTE — PROGRESS NOTES
Discharge Planning Assessment  [facility]     Patient Name: Iva Cameron  MRN: [id number]  Today's Date: 8/8/2019    Admit Date: 8/7/2019      Discharge Plan     Row Name 08/08/19 0822       Plan    Plan Comments  Discharge order, no discharge needs identified.    Final Discharge Disposition Code  01 - home or self-care     Expected Discharge Date and Time     Expected Discharge Date Expected Discharge Time    Aug 8, 2019      Keya Flynn RN

## 2019-08-08 NOTE — PLAN OF CARE
Problem: Patient Care Overview  Goal: Plan of Care Review  Outcome: Ongoing (interventions implemented as appropriate)   08/08/19 0340   Coping/Psychosocial   Plan of Care Reviewed With patient   Plan of Care Review   Progress improving   OTHER   Outcome Summary VSS. Pain well controlled. Incison site clean and intact. No c/o of tingling sensation.  Regular food tolerated. Oral Fluid intake large amount with large amount of urine output. IV Fluids discontinued. Ambulated in the yee. Slept well.     Goal: Individualization and Mutuality  Outcome: Ongoing (interventions implemented as appropriate)    Goal: Discharge Needs Assessment  Outcome: Ongoing (interventions implemented as appropriate)    Goal: Interprofessional Rounds/Family Conf  Outcome: Ongoing (interventions implemented as appropriate)      Problem: Pain, Acute (Adult)  Goal: Identify Related Risk Factors and Signs and Symptoms  Outcome: Ongoing (interventions implemented as appropriate)    Goal: Acceptable Pain Control/Comfort Level  Outcome: Ongoing (interventions implemented as appropriate)

## 2019-08-09 ENCOUNTER — TELEPHONE (OUTPATIENT)
Dept: SURGERY | Facility: CLINIC | Age: 34
End: 2019-08-09

## 2019-08-09 LAB
LAB AP CASE REPORT: NORMAL
LAB AP DIAGNOSIS COMMENT: NORMAL
LAB AP SYNOPTIC CHECKLIST: NORMAL
PATH REPORT.FINAL DX SPEC: NORMAL
PATH REPORT.GROSS SPEC: NORMAL

## 2019-08-09 NOTE — TELEPHONE ENCOUNTER
Dr Scott from pathology called regarding this patients path report. Had total Thyroidectomy 8/7/19. She stated it did show Carcinoma. Also said if you have any questions you can reach her at [phone]. Pt  has P.O. appt with you on 8/14.

## 2019-08-12 DIAGNOSIS — E05.90 HYPERTHYROIDISM: ICD-10-CM

## 2019-08-12 DIAGNOSIS — E04.1 THYROID NODULE: Primary | ICD-10-CM

## 2019-08-12 RX ORDER — LEVOTHYROXINE SODIUM 0.12 MG/1
125 TABLET ORAL DAILY
Qty: 30 TABLET | Refills: 4 | Status: SHIPPED | OUTPATIENT
Start: 2019-08-12 | End: 2019-10-10

## 2019-08-14 ENCOUNTER — OFFICE VISIT (OUTPATIENT)
Dept: SURGERY | Facility: CLINIC | Age: 34
End: 2019-08-14

## 2019-08-14 DIAGNOSIS — C73 THYROID CANCER (HCC): Primary | ICD-10-CM

## 2019-08-14 PROCEDURE — 99024 POSTOP FOLLOW-UP VISIT: CPT | Performed by: SURGERY

## 2019-08-14 RX ORDER — CLINDAMYCIN HYDROCHLORIDE 300 MG/1
300 CAPSULE ORAL 3 TIMES DAILY
Qty: 15 CAPSULE | Refills: 0 | Status: SHIPPED | OUTPATIENT
Start: 2019-08-14 | End: 2019-08-19

## 2019-08-14 RX ORDER — METAXALONE 800 MG/1
800 TABLET ORAL 2 TIMES DAILY
Qty: 28 TABLET | Refills: 0 | Status: SHIPPED | OUTPATIENT
Start: 2019-08-14 | End: 2019-08-26 | Stop reason: ALTCHOICE

## 2019-08-14 RX ORDER — HYDROCODONE BITARTRATE AND ACETAMINOPHEN 5; 325 MG/1; MG/1
1 TABLET ORAL EVERY 4 HOURS PRN
Qty: 16 TABLET | Refills: 0 | Status: SHIPPED | OUTPATIENT
Start: 2019-08-14 | End: 2019-08-19

## 2019-08-14 NOTE — PROGRESS NOTES
SURGERY: VY  Post op Visit  Iva Cameron  08/14/19    Patient presents today after having undergone Surgery 8/7/2019, of a total thyroidectomy.  This was done for hyperthyroidism with bilateral thyroid nodules, FNA benign.  Unfortunately she did not stop smoking as requested and in fact smoked on the morning of surgery.  I counseled her prior to surgery on that day that this increased her risk of perioperative wound problems.    She comes in today and is having some redness and swelling at her incision.  She said the redness is progressively getting worse.  She also is developing hoarseness which she did not have postoperatively until about 2 days ago.    There is some puffiness in the area and I really would not worry about that in general except for her symptoms and the redness.  Based on that I am going to start her on clindamycin since she is allergic to penicillin.  I also gave her prescription for hydrocodone at her request, and Skelaxin for posterior neck pain.    I gave her the information about her papillary cancer.  I told her that I felt like we probably had completely cured and she would not need anything else but she should follow-up with Dr. Jeronimo Poe regarding that.      I will see her back next week.    Court Gaitan MD

## 2019-08-19 ENCOUNTER — OFFICE VISIT (OUTPATIENT)
Dept: SURGERY | Facility: CLINIC | Age: 34
End: 2019-08-19

## 2019-08-19 DIAGNOSIS — C73 THYROID CANCER (HCC): Primary | ICD-10-CM

## 2019-08-19 PROCEDURE — 99024 POSTOP FOLLOW-UP VISIT: CPT | Performed by: SURGERY

## 2019-08-20 NOTE — PROGRESS NOTES
SURGERY: VY  Post op Visit  Iva Cameron  08/19/19    Patient presents today after having undergone Surgery 8/7/2019, of a total thyroidectomy.  This was done for hyperthyroidism with bilateral thyroid nodules, FNA benign.  Unfortunately she did not stop smoking as requested and in fact smoked on the morning of surgery.  I counseled her prior to surgery on that day that this increased her risk of perioperative wound problems.    She came in last week with some redness and swelling at the incision complaining of hoarseness.  I started her on clindamycin, and today the redness is completely gone, hoarseness gone.  She does request that I extend her time off.  She believes the Skelaxin helped with her neck pain and the hydrocodone.    We discussed again the information about her papillary cancer.  I told her that I felt like we probably had completely cured and she would not need anything else but she should follow-up with Dr. Jeronimo Poe regarding that.      I will see her as needed.      Court Gaitan MD

## 2019-08-22 ENCOUNTER — TELEPHONE (OUTPATIENT)
Dept: ENDOCRINOLOGY | Facility: CLINIC | Age: 34
End: 2019-08-22

## 2019-08-22 NOTE — TELEPHONE ENCOUNTER
She should be on Os-Agustin D5 500/200, 1 tablet 3 times a day and check CMP.  If the symptoms of numbness and tingling continue that she can take 4 tablets of Os-Agustin D.

## 2019-08-22 NOTE — TELEPHONE ENCOUNTER
Patient called and stated she has pins and needle feeling in her legs. She has been experiencing it for the last 2 days. She stated when she sits down it is worse. She had her thyroid taken out on the 7th of August and she found it was cancerous. Dr. Gaitan told the patient to call us if she felt any pins and needles. Please advise.

## 2019-08-23 RX ORDER — CHOLECALCIFEROL (VITAMIN D3) 50 MCG
2000 TABLET ORAL DAILY
Qty: 30 TABLET | Refills: 3 | Status: SHIPPED | OUTPATIENT
Start: 2019-08-23 | End: 2019-12-30

## 2019-08-23 NOTE — TELEPHONE ENCOUNTER
Patient notified. She is at [facility] right now and getting her labs done. She already had a CMP ordered. She is coming in next week for her appointment. Rx sent. [facility] will send us her lab results.

## 2019-08-26 ENCOUNTER — OFFICE VISIT (OUTPATIENT)
Dept: ENDOCRINOLOGY | Facility: CLINIC | Age: 34
End: 2019-08-26

## 2019-08-26 VITALS
OXYGEN SATURATION: 98 % | DIASTOLIC BLOOD PRESSURE: 70 MMHG | HEIGHT: 63 IN | WEIGHT: 182 LBS | SYSTOLIC BLOOD PRESSURE: 115 MMHG | HEART RATE: 92 BPM | BODY MASS INDEX: 32.25 KG/M2

## 2019-08-26 DIAGNOSIS — E83.51 HYPOCALCEMIA: ICD-10-CM

## 2019-08-26 DIAGNOSIS — E89.0 POSTOPERATIVE HYPOTHYROIDISM: ICD-10-CM

## 2019-08-26 DIAGNOSIS — C73 THYROID CANCER (HCC): Primary | ICD-10-CM

## 2019-08-26 PROBLEM — E05.90 HYPERTHYROIDISM: Status: RESOLVED | Noted: 2019-04-26 | Resolved: 2019-08-26

## 2019-08-26 PROCEDURE — 99214 OFFICE O/P EST MOD 30 MIN: CPT | Performed by: INTERNAL MEDICINE

## 2019-08-26 NOTE — PROGRESS NOTES
Endocrine Progress Note Outpatient     Patient Care Team:  Provider, No Known as PCP - General  Provider, No Known as PCP - Family Medicine    Chief Complaint: Follow-up hyperthyroidism    HPI: 34-year-old female who was seen here on May 2019 for thyroid nodule and hyperthyroidism.  Her work-up showed an uptake of 30.2% with a cold not in the left side.  Her thyroid ultrasound showed a right mid thyroid lobe nodule at 2.3 cm in the left 1.8 cm nodule.  Biopsy of both right and the left nodules were benign in March 2019.  As part of the treatment after discussions with all options including surgery versus antithyroid medication radioactive iodine treatment she chose to proceed with surgery and saw Dr. Court Walton.  She underwent total thyroidectomy on August 7, 2019.  Pathology showed a 5 mm classic papillary thyroid cancer with margins were uninvolved.  She is currently on levothyroxine 125 mcg p.o. daily.  She is also on calcium supplementation.  She feels cold all the time.  She did have some finger tingling which is better after calcium supplementation she still has some tingling in the legs.  She is taking her calcium and vitamin D along with thyroid pills on a regular basis.    Past Medical History:   Diagnosis Date   • Arthritis    • History of abnormal cervical Pap smear    • History of migraine     AS A CHILD   • History of MRSA infection     UNDER LEFT GLUTEAL FOLD, TREATED AT [facility] IN [place], APPROX. 7 YEARS AGO   • Hyperthyroidism    • Lumbar disc disease    • Oropharyngeal dysphagia    • Papillary microcarcinoma of thyroid (CMS/[facility]) 08/09/2019    s/p Total Thyroidectomy   • Thyroid nodule        Social History     Socioeconomic History   • Marital status:      Spouse name: Not on file   • Number of children: 2   • Years of education: Not on file   • Highest education level: GED or equivalent   Occupational History   • Occupation:      Employer: [email or web address]   Tobacco Use   • Smoking status: Current Every Day Smoker     Packs/day: 0.50     Years: 20.00     Pack years: 10.00   • Smokeless tobacco: Never Used   Substance and Sexual Activity   • Alcohol use: No     Frequency: Never   • Drug use: No   • Sexual activity: Defer       Family History   Problem Relation Age of Onset   • Breast cancer Mother    • Diabetes Mother    • Cervical cancer Mother    • Cervical cancer Maternal Grandmother    • Diabetes Other         grandmother   • Hypertension Other         grandmother   • Stroke Other         grandfather   • Malig Hyperthermia Neg Hx        Allergies   Allergen Reactions   • Penicillins Anaphylaxis     ALL 'CILLIN' FAMILY   • Gabapentin GI Intolerance   • Latex Rash   • Tramadol GI Intolerance   • Diphenhydramine Hives and Itching   • Morphine Hives       ROS:   Constitutional:  Denies fatigue, tiredness.    Eyes:  Denies change in visual acuity   HENT:  Denies nasal congestion or sore throat   Respiratory: denies cough, shortness of breath.   Cardiovascular:  denies chest pain, edema   GI:  Denies abdominal pain, nausea, vomiting.   Musculoskeletal:  Denies back pain or joint pain   Integument:  Denies dry skin and rash   Neurologic:  Denies headache, focal weakness or sensory changes   Endocrine:  Denies polyuria or polydipsia   Psychiatric:  Denies depression or anxiety      Vitals:    08/26/19 1548   BP: 115/70   Pulse: 92   SpO2: 98%       Physical Exam:  GEN: NAD, conversant  EYES: EOMI, PERRL, no conjunctival erythema  NECK: no thyromegaly, full ROM   CV: RRR, no murmurs/rubs/gallops, no peripheral edema  LUNG: CTAB, no wheezes/rales/ronchi  SKIN: no rashes, no acanthosis  MSK: no deformities, full ROM of all extremities  NEURO: no tremors, DTR normal  PSYCH: AOX3, appropriate mood, affect normal      Results Review:     I reviewed the patient's new clinical results.    No results found for: HGBA1C   Lab Results   Component Value Date     GLUCOSE 89 07/29/2019    BUN 10 07/29/2019    CREATININE 0.76 07/29/2019    EGFRIFNONA 86 07/29/2019    EGFRIFAFRI 113 05/11/2016    BCR 13.2 07/29/2019    K 3.5 07/29/2019    CO2 19.7 (L) 07/29/2019    CALCIUM 8.8 08/08/2019    ALBUMIN 4.1 05/11/2016    AST 21 05/11/2016    ALT 36 (H) 05/11/2016     Lab Results   Component Value Date    TSH 0.401 08/07/2019    FREET4 1.1 05/24/2016     Tissue Pathology Exam: [id number]   Order: [id number]   Collected:  8/7/2019 09:23   Status:  Final result   Visible to patient:  No (Not Released)   Dx:  Hyperthyroidism   Component    Final Diagnosis   1. Thyroid, Total Thyroidectomy (49 grams):               A. PAPILLARY MICROCARCINOMA, CLASSIC TYPE, measuring 0.5 cm (see Synoptic Report and Comment).               B. Margins are negative for carcinoma; Closest distance: 1 mm from posterior margin.                C. Negative for vascular and lymphatic space invasion.               D. Negative for extrathyroidal extension.               E. Background thyroid with benign adenomatoid nodules (largest is 1.5 cm in greatest dimension) and                   associated procedure-related changes.    Electronically signed by Mary Lou Scott MD on 8/9/2019 at 0857               Medication Review: Reviewed.       Current Outpatient Medications:   •  calcium-vitamin D (OSCAL 500/200 D-3) 500-200 MG-UNIT per tablet, Take 1 tablet by mouth 2 (Two) Times a Day., Disp: 180 tablet, Rfl: 3  •  Cholecalciferol (VITAMIN D) 2000 units tablet, Take 2,000 Units by mouth Daily., Disp: 30 tablet, Rfl: 3  •  levothyroxine (SYNTHROID) 125 MCG tablet, Take 1 tablet by mouth Daily., Disp: 30 tablet, Rfl: 4      Assessment/Plan   Papillary microcarcinoma of thyroid: Status post total thyroidectomy.  Given that the tumor was only 5 mm and things were clear, I do not think that she needs radioactive iodine treatment at this time.  Risk for recurrence is low.  Stage I disease.  Hypothyroidism secondary to total  "thyroidectomy: On levothyroxine supplementation, will follow TSH and free T4 and make further recommendations as needed.  She tells us that she had done some labs, we have been trying to get it from the [facility] lab without any success.  I will continue levothyroxine and follow labs in 4 weeks.  Hypocalcemia: She is currently on calcium with vitamin D supplementation, will follow CMP.  Her PTH level was normal at 20.9 one day after the surgery.            Jeronimo Poe MD FACE.  06/15/19  4:34 PM      EMR Dragon / transcription disclaimer:     \"Dictated utilizing Dragon dictation\".                 "

## 2019-09-03 ENCOUNTER — TELEPHONE (OUTPATIENT)
Dept: ENDOCRINOLOGY | Facility: CLINIC | Age: 34
End: 2019-09-03

## 2019-09-03 NOTE — TELEPHONE ENCOUNTER
Her TSH, free T4, calcium level were normal.  Done on August 23, 2019.  She can follow with her family physician for anxiety.

## 2019-09-03 NOTE — TELEPHONE ENCOUNTER
Patient called and stated she has her surgery on 08/07/19. She stated she is having more anxiety than she anticipated and wants to know if there is anything she can do to help with this. She is also requesting her most recent lab results. Please advise.

## 2019-09-26 ENCOUNTER — RESULTS ENCOUNTER (OUTPATIENT)
Dept: ENDOCRINOLOGY | Facility: CLINIC | Age: 34
End: 2019-09-26

## 2019-09-26 DIAGNOSIS — E83.51 HYPOCALCEMIA: ICD-10-CM

## 2019-09-26 DIAGNOSIS — C73 THYROID CANCER (HCC): ICD-10-CM

## 2019-09-26 DIAGNOSIS — E89.0 POSTOPERATIVE HYPOTHYROIDISM: ICD-10-CM

## 2019-10-08 LAB
ALBUMIN SERPL-MCNC: 4.4 G/DL (ref 3.5–5.5)
ALBUMIN/GLOB SERPL: 1.9 {RATIO} (ref 1.2–2.2)
ALP SERPL-CCNC: 67 IU/L (ref 39–117)
ALT SERPL-CCNC: 37 IU/L (ref 0–32)
AST SERPL-CCNC: 27 IU/L (ref 0–40)
BILIRUB SERPL-MCNC: <0.2 MG/DL (ref 0–1.2)
BUN SERPL-MCNC: 15 MG/DL (ref 6–20)
BUN/CREAT SERPL: 18 (ref 9–23)
CALCIUM SERPL-MCNC: 9.3 MG/DL (ref 8.7–10.2)
CHLORIDE SERPL-SCNC: 104 MMOL/L (ref 96–106)
CO2 SERPL-SCNC: 26 MMOL/L (ref 20–29)
CREAT SERPL-MCNC: 0.82 MG/DL (ref 0.57–1)
GLOBULIN SER CALC-MCNC: 2.3 G/DL (ref 1.5–4.5)
GLUCOSE SERPL-MCNC: 73 MG/DL (ref 65–99)
POTASSIUM SERPL-SCNC: 4 MMOL/L (ref 3.5–5.2)
PROT SERPL-MCNC: 6.7 G/DL (ref 6–8.5)
PTH-INTACT SERPL-MCNC: 23 PG/ML (ref 15–65)
SODIUM SERPL-SCNC: 143 MMOL/L (ref 134–144)
T4 FREE SERPL-MCNC: 1.26 NG/DL (ref 0.82–1.77)
TSH SERPL DL<=0.005 MIU/L-ACNC: 6.01 UIU/ML (ref 0.45–4.5)

## 2019-10-10 DIAGNOSIS — E89.0 POSTOPERATIVE HYPOTHYROIDISM: ICD-10-CM

## 2019-10-10 DIAGNOSIS — C73 THYROID CANCER (HCC): Primary | ICD-10-CM

## 2019-10-10 RX ORDER — LEVOTHYROXINE SODIUM 137 UG/1
137 TABLET ORAL DAILY
Qty: 30 TABLET | Refills: 3 | Status: SHIPPED | OUTPATIENT
Start: 2019-10-10 | End: 2020-01-20

## 2019-11-21 ENCOUNTER — RESULTS ENCOUNTER (OUTPATIENT)
Dept: ENDOCRINOLOGY | Facility: CLINIC | Age: 34
End: 2019-11-21

## 2019-11-21 DIAGNOSIS — C73 THYROID CANCER (HCC): ICD-10-CM

## 2019-11-21 DIAGNOSIS — E89.0 POSTOPERATIVE HYPOTHYROIDISM: ICD-10-CM

## 2019-12-08 LAB
T4 FREE SERPL-MCNC: 1.07 NG/DL (ref 0.82–1.77)
TSH SERPL DL<=0.005 MIU/L-ACNC: 1.39 UIU/ML (ref 0.45–4.5)

## 2019-12-30 RX ORDER — CHOLECALCIFEROL (VITAMIN D3) 50 MCG
TABLET ORAL
Qty: 30 TABLET | Refills: 3 | Status: SHIPPED | OUTPATIENT
Start: 2019-12-30 | End: 2020-05-22

## 2020-01-13 ENCOUNTER — OFFICE VISIT (OUTPATIENT)
Dept: ENDOCRINOLOGY | Facility: CLINIC | Age: 35
End: 2020-01-13

## 2020-01-13 VITALS
HEART RATE: 104 BPM | WEIGHT: 189 LBS | OXYGEN SATURATION: 98 % | SYSTOLIC BLOOD PRESSURE: 110 MMHG | HEIGHT: 63 IN | BODY MASS INDEX: 33.49 KG/M2 | DIASTOLIC BLOOD PRESSURE: 70 MMHG

## 2020-01-13 DIAGNOSIS — C73 THYROID CANCER (HCC): Primary | ICD-10-CM

## 2020-01-13 DIAGNOSIS — E89.0 POSTOPERATIVE HYPOTHYROIDISM: ICD-10-CM

## 2020-01-13 DIAGNOSIS — E83.51 HYPOCALCEMIA: ICD-10-CM

## 2020-01-13 PROCEDURE — 99214 OFFICE O/P EST MOD 30 MIN: CPT | Performed by: INTERNAL MEDICINE

## 2020-01-13 NOTE — PROGRESS NOTES
Endocrine Progress Note Outpatient     Patient Care Team:  Provider, No Known as PCP - General  Provider, No Known as PCP - Family Medicine    Chief Complaint: Follow-up hyperthyroidism    HPI: 34-year-old female who was seen here on May 2019 for thyroid nodule and hyperthyroidism.  Her work-up showed an uptake of 30.2% with a cold not in the left side.  Her thyroid ultrasound showed a right mid thyroid lobe nodule at 2.3 cm in the left 1.8 cm nodule.  Biopsy of both right and the left nodules were benign in March 2019.  As part of the treatment after discussions with all options including surgery versus antithyroid medication radioactive iodine treatment she chose to proceed with surgery and saw Dr. Court Gaitan.  She underwent total thyroidectomy on August 7, 2019.  Pathology showed a 5 mm classic papillary thyroid cancer with margins were uninvolved.  She is currently on levothyroxine 137 mcg p.o. daily.  She is also on calcium supplementation.  She still feels cold all the time.  She did have some finger tingling which is better after calcium supplementation she still has some tingling in the legs.  She is taking her calcium and vitamin D along with thyroid pills on a regular basis.    Past Medical History:   Diagnosis Date   • Arthritis    • History of abnormal cervical Pap smear    • History of migraine     AS A CHILD   • History of MRSA infection     UNDER LEFT GLUTEAL FOLD, TREATED AT [facility] IN [place], APPROX. 7 YEARS AGO   • Hyperthyroidism    • Lumbar disc disease    • Neck pain    • Oropharyngeal dysphagia    • Papillary microcarcinoma of thyroid (CMS/[facility]) 08/09/2019    s/p Total Thyroidectomy   • Thyroid nodule        Social History     Socioeconomic History   • Marital status:      Spouse name: Not on file   • Number of children: 2   • Years of education: Not on file   • Highest education level: GED or equivalent   Occupational History   • Occupation:       Employer: [employer]   Tobacco Use   • Smoking status: Current Every Day Smoker     Packs/day: 0.50     Years: 20.00     Pack years: 10.00   • Smokeless tobacco: Never Used   Substance and Sexual Activity   • Alcohol use: No     Frequency: Never   • Drug use: No   • Sexual activity: Defer       Family History   Problem Relation Age of Onset   • Breast cancer Mother    • Diabetes Mother    • Cervical cancer Mother    • Cervical cancer Maternal Grandmother    • Diabetes Other         grandmother   • Hypertension Other         grandmother   • Stroke Other         grandfather   • Malig Hyperthermia Neg Hx        Allergies   Allergen Reactions   • Penicillins Anaphylaxis     ALL 'CILLIN' FAMILY   • Gabapentin GI Intolerance   • Latex Rash   • Tramadol GI Intolerance   • Diphenhydramine Hives and Itching   • Morphine Hives       ROS:   Constitutional:  Admit fatigue, tiredness.    Eyes:  Denies change in visual acuity   HENT:  Denies nasal congestion or sore throat   Respiratory: denies cough, admit shortness of breath.   Cardiovascular:  denies chest pain, edema   GI:  Denies abdominal pain, nausea, vomiting.   Musculoskeletal:  Denies back pain or joint pain   Integument:  Denies dry skin and rash   Neurologic:  Denies headache, focal weakness or sensory changes   Endocrine:  Denies polyuria or polydipsia   Psychiatric:  Admit depression, denies anxiety      Vitals:    01/13/20 1456   BP: 110/70   Pulse: 104   SpO2: 98%       Physical Exam:  GEN: NAD, conversant  EYES: EOMI, PERRL, no conjunctival erythema  NECK: no thyromegaly, full ROM   CV: RRR, no murmurs/rubs/gallops, no peripheral edema  LUNG: CTAB, no wheezes/rales/ronchi  SKIN: no rashes, no acanthosis  MSK: no deformities, full ROM of all extremities  NEURO: no tremors, DTR normal  PSYCH: AOX3, appropriate mood, affect normal      Results Review:     I reviewed the patient's new clinical results.      Lab Results   Component Value Date     GLUCOSE 89 07/29/2019    BUN 15 10/07/2019    CREATININE 0.82 10/07/2019    EGFRIFNONA 92 10/07/2019    EGFRIFAFRI 106 10/07/2019    BCR 18 10/07/2019    K 4.0 10/07/2019    CO2 26 10/07/2019    CALCIUM 9.3 10/07/2019    PROTENTOTREF 6.7 10/07/2019    ALBUMIN 4.4 10/07/2019    LABIL2 1.9 10/07/2019    AST 27 10/07/2019    ALT 37 (H) 10/07/2019     Lab Results   Component Value Date    TSH 1.390 12/07/2019    FREET4 1.07 12/07/2019     Tissue Pathology Exam: [id number]   Order: [id number]   Collected:  8/7/2019 09:23   Status:  Final result   Visible to patient:  No (Not Released)   Dx:  Hyperthyroidism   Component    Final Diagnosis   1. Thyroid, Total Thyroidectomy (49 grams):               A. PAPILLARY MICROCARCINOMA, CLASSIC TYPE, measuring 0.5 cm (see Synoptic Report and Comment).               B. Margins are negative for carcinoma; Closest distance: 1 mm from posterior margin.                C. Negative for vascular and lymphatic space invasion.               D. Negative for extrathyroidal extension.               E. Background thyroid with benign adenomatoid nodules (largest is 1.5 cm in greatest dimension) and                   associated procedure-related changes.    Electronically signed by Mary Lou Scott MD on 8/9/2019 at 0857               Medication Review: Reviewed.       Current Outpatient Medications:   •  calcium-vitamin D (OSCAL 500/200 D-3) 500-200 MG-UNIT per tablet, Take 1 tablet by mouth 2 (Two) Times a Day., Disp: 180 tablet, Rfl: 3  •  Cholecalciferol (VITAMIN D) 50 MCG (2000 UT) tablet, TAKE 1 TABLET BY MOUTH DAILY., Disp: 30 tablet, Rfl: 3  •  levothyroxine (SYNTHROID, LEVOTHROID) 137 MCG tablet, Take 1 tablet by mouth Daily., Disp: 30 tablet, Rfl: 3      Assessment/Plan   1.  Papillary microcarcinoma of thyroid: Status post total thyroidectomy.  Given that the tumor was only 5 mm and things were clear, I do not think that she needs radioactive iodine treatment at this time.  Risk for  "recurrence is low.  Stage I disease.  Will check TG panel.    2.  Hypothyroidism secondary to total thyroidectomy: On levothyroxine supplementation, will follow TSH and free T4 and make further recommendations as needed.  She tells us that she had done some labs, we have been trying to get it from the [facility] lab without any success.  I will continue levothyroxine and follow labs in 4 weeks.      3. Hypocalcemia: She is currently on calcium with vitamin D supplementation, will follow CMP.  Her PTH level was normal.             Jeronimo Poe MD FACE.  06/15/19  4:34 PM      EMR Dragon / transcription disclaimer:     \"Dictated utilizing Dragon dictation\".                 "

## 2020-01-17 LAB
ALBUMIN SERPL-MCNC: 4.6 G/DL (ref 3.5–5.5)
ALBUMIN/GLOB SERPL: 2.2 {RATIO} (ref 1.2–2.2)
ALP SERPL-CCNC: 61 IU/L (ref 39–117)
ALT SERPL-CCNC: 26 IU/L (ref 0–32)
AST SERPL-CCNC: 23 IU/L (ref 0–40)
BILIRUB SERPL-MCNC: 0.4 MG/DL (ref 0–1.2)
BUN SERPL-MCNC: 15 MG/DL (ref 6–20)
BUN/CREAT SERPL: 16 (ref 9–23)
CALCIUM SERPL-MCNC: 9.5 MG/DL (ref 8.7–10.2)
CHLORIDE SERPL-SCNC: 105 MMOL/L (ref 96–106)
CO2 SERPL-SCNC: 24 MMOL/L (ref 20–29)
CREAT SERPL-MCNC: 0.96 MG/DL (ref 0.57–1)
GLOBULIN SER CALC-MCNC: 2.1 G/DL (ref 1.5–4.5)
GLUCOSE SERPL-MCNC: 67 MG/DL (ref 65–99)
POTASSIUM SERPL-SCNC: 4.2 MMOL/L (ref 3.5–5.2)
PROT SERPL-MCNC: 6.7 G/DL (ref 6–8.5)
SODIUM SERPL-SCNC: 142 MMOL/L (ref 134–144)
T4 FREE SERPL-MCNC: 1.17 NG/DL (ref 0.82–1.77)
THYROGLOB AB SERPL-ACNC: <1 IU/ML
THYROGLOB SERPL-MCNC: 0.1 NG/ML
THYROGLOB SERPL-MCNC: NORMAL NG/ML
TSH SERPL DL<=0.005 MIU/L-ACNC: 4.85 UIU/ML (ref 0.45–4.5)

## 2020-01-20 DIAGNOSIS — E89.0 POSTOPERATIVE HYPOTHYROIDISM: Primary | ICD-10-CM

## 2020-01-20 DIAGNOSIS — C73 THYROID CANCER (HCC): ICD-10-CM

## 2020-01-20 RX ORDER — LEVOTHYROXINE SODIUM 0.15 MG/1
150 TABLET ORAL DAILY
Qty: 30 TABLET | Refills: 5 | Status: SHIPPED | OUTPATIENT
Start: 2020-01-20 | End: 2020-02-06 | Stop reason: SDUPTHER

## 2020-02-06 RX ORDER — LEVOTHYROXINE SODIUM 0.15 MG/1
150 TABLET ORAL DAILY
Qty: 30 TABLET | Refills: 5 | Status: SHIPPED | OUTPATIENT
Start: 2020-02-06 | End: 2020-03-16

## 2020-02-06 RX ORDER — LEVOTHYROXINE SODIUM 137 UG/1
137 TABLET ORAL DAILY
Qty: 30 TABLET | Refills: 3 | OUTPATIENT
Start: 2020-02-06

## 2020-03-02 ENCOUNTER — RESULTS ENCOUNTER (OUTPATIENT)
Dept: ENDOCRINOLOGY | Facility: CLINIC | Age: 35
End: 2020-03-02

## 2020-03-02 DIAGNOSIS — C73 THYROID CANCER (HCC): ICD-10-CM

## 2020-03-02 DIAGNOSIS — E89.0 POSTOPERATIVE HYPOTHYROIDISM: ICD-10-CM

## 2020-03-14 LAB
T4 FREE SERPL-MCNC: 1.3 NG/DL (ref 0.82–1.77)
TSH SERPL DL<=0.005 MIU/L-ACNC: 7.97 UIU/ML (ref 0.45–4.5)

## 2020-03-16 DIAGNOSIS — E89.0 POSTOPERATIVE HYPOTHYROIDISM: Primary | ICD-10-CM

## 2020-03-16 DIAGNOSIS — C73 THYROID CANCER (HCC): ICD-10-CM

## 2020-03-16 RX ORDER — LEVOTHYROXINE SODIUM 175 UG/1
175 TABLET ORAL DAILY
Qty: 30 TABLET | Refills: 5 | Status: SHIPPED | OUTPATIENT
Start: 2020-03-16 | End: 2020-05-22

## 2020-04-27 ENCOUNTER — RESULTS ENCOUNTER (OUTPATIENT)
Dept: ENDOCRINOLOGY | Facility: CLINIC | Age: 35
End: 2020-04-27

## 2020-04-27 DIAGNOSIS — E89.0 POSTOPERATIVE HYPOTHYROIDISM: ICD-10-CM

## 2020-04-27 DIAGNOSIS — C73 THYROID CANCER (HCC): ICD-10-CM

## 2020-05-20 LAB
T4 FREE SERPL-MCNC: 1.6 NG/DL (ref 0.82–1.77)
TSH SERPL DL<=0.005 MIU/L-ACNC: 6.88 UIU/ML (ref 0.45–4.5)

## 2020-05-22 DIAGNOSIS — E89.0 POSTOPERATIVE HYPOTHYROIDISM: Primary | ICD-10-CM

## 2020-05-22 DIAGNOSIS — C73 THYROID CANCER (HCC): ICD-10-CM

## 2020-05-22 RX ORDER — CHOLECALCIFEROL (VITAMIN D3) 125 MCG
CAPSULE ORAL
Qty: 30 TABLET | Refills: 3 | Status: SHIPPED | OUTPATIENT
Start: 2020-05-22 | End: 2020-10-30 | Stop reason: SDUPTHER

## 2020-05-22 RX ORDER — LEVOTHYROXINE SODIUM 0.15 MG/1
150 TABLET ORAL DAILY
Qty: 30 TABLET | Refills: 5 | Status: SHIPPED | OUTPATIENT
Start: 2020-05-22 | End: 2020-05-26

## 2020-05-26 RX ORDER — LEVOTHYROXINE SODIUM 0.2 MG/1
200 TABLET ORAL DAILY
Qty: 30 TABLET | Refills: 5 | Status: SHIPPED | OUTPATIENT
Start: 2020-05-26 | End: 2020-12-07 | Stop reason: SDUPTHER

## 2020-05-28 DIAGNOSIS — F32.A DEPRESSION, UNSPECIFIED DEPRESSION TYPE: Primary | ICD-10-CM

## 2020-07-09 ENCOUNTER — RESULTS ENCOUNTER (OUTPATIENT)
Dept: ENDOCRINOLOGY | Facility: CLINIC | Age: 35
End: 2020-07-09

## 2020-07-09 DIAGNOSIS — E89.0 POSTOPERATIVE HYPOTHYROIDISM: ICD-10-CM

## 2020-07-09 DIAGNOSIS — C73 THYROID CANCER (HCC): ICD-10-CM

## 2020-07-16 ENCOUNTER — OFFICE VISIT (OUTPATIENT)
Dept: ENDOCRINOLOGY | Facility: CLINIC | Age: 35
End: 2020-07-16

## 2020-07-16 VITALS
TEMPERATURE: 98.2 F | BODY MASS INDEX: 32.96 KG/M2 | HEIGHT: 63 IN | WEIGHT: 186 LBS | SYSTOLIC BLOOD PRESSURE: 110 MMHG | OXYGEN SATURATION: 99 % | HEART RATE: 94 BPM | DIASTOLIC BLOOD PRESSURE: 70 MMHG

## 2020-07-16 DIAGNOSIS — E89.0 POSTOPERATIVE HYPOTHYROIDISM: ICD-10-CM

## 2020-07-16 DIAGNOSIS — C73 THYROID CANCER (HCC): Primary | ICD-10-CM

## 2020-07-16 DIAGNOSIS — E83.51 HYPOCALCEMIA: ICD-10-CM

## 2020-07-16 PROCEDURE — 99214 OFFICE O/P EST MOD 30 MIN: CPT | Performed by: INTERNAL MEDICINE

## 2020-07-16 RX ORDER — HYDROCODONE BITARTRATE AND ACETAMINOPHEN 5; 325 MG/1; MG/1
TABLET ORAL
COMMUNITY
Start: 2020-07-06 | End: 2020-07-16

## 2020-07-16 NOTE — PROGRESS NOTES
Endocrine Progress Note Outpatient     Patient Care Team:  Provider, No Known as PCP - General  Provider, No Known as PCP - Family Medicine    Chief Complaint: Follow-up hypothyroidism    HPI: 35-year-old female with history of hypothyroidism underwent total thyroidectomy which was done on August 7, 2019.  Pathology showed a 5 mm papillary thyroid cancer, margins were uninvolved, she was not treated with radioactive iodine because the risk was very low for recurrence and residual disease.  She is currently taking levothyroxine 200 mcg p.o. daily.      Hypocalcemia: She has been taking some calcium supplementation.  She was diagnosed with kidney stones couple of weeks ago.        Past Medical History:   Diagnosis Date   • Arthritis    • History of abnormal cervical Pap smear    • History of migraine     AS A CHILD   • History of MRSA infection     UNDER LEFT GLUTEAL FOLD, TREATED AT [facility] IN [place], APPROX. 7 YEARS AGO   • Hyperthyroidism    • Lumbar disc disease    • Neck pain    • Oropharyngeal dysphagia    • Papillary microcarcinoma of thyroid (CMS/[facility]) 08/09/2019    s/p Total Thyroidectomy   • Thyroid nodule        Social History     Socioeconomic History   • Marital status:      Spouse name: Not on file   • Number of children: 2   • Years of education: Not on file   • Highest education level: GED or equivalent   Occupational History   • Occupation:      Employer: [employer]   Tobacco Use   • Smoking status: Current Every Day Smoker     Packs/day: 0.50     Years: 20.00     Pack years: 10.00   • Smokeless tobacco: Never Used   Substance and Sexual Activity   • Alcohol use: No     Frequency: Never   • Drug use: No   • Sexual activity: Defer       Family History   Problem Relation Age of Onset   • Breast cancer Mother    • Diabetes Mother    • Cervical cancer Mother    • Cervical cancer Maternal Grandmother    • Diabetes Other         grandmother   •  Hypertension Other         grandmother   • Stroke Other         grandfather   • Malig Hyperthermia Neg Hx        Allergies   Allergen Reactions   • Penicillins Anaphylaxis     ALL 'CILLIN' FAMILY   • Gabapentin GI Intolerance   • Latex Rash   • Tramadol GI Intolerance   • Diphenhydramine Hives and Itching   • Morphine Hives       ROS:   Constitutional:  Admit fatigue, tiredness.    Eyes:  Denies change in visual acuity   HENT:  Denies nasal congestion or sore throat   Respiratory: denies cough, Denies shortness of breath.   Cardiovascular:  denies chest pain, edema   GI:  Denies abdominal pain, admit nausea, denies vomiting.   Musculoskeletal:  Denies back pain or joint pain   Integument:  Denies dry skin and rash   Neurologic:  Denies headache, focal weakness or sensory changes   Endocrine:  Denies polyuria or polydipsia   Psychiatric:  Admit depression, denies anxiety      Vitals:    07/16/20 1456   BP: 110/70   Pulse: 94   Temp: 98.2 °F (36.8 °C)   SpO2: 99%       Physical Exam:  GEN: NAD, conversant  EYES: EOMI, PERRL, no conjunctival erythema  NECK: no thyromegaly, full ROM   CV: RRR, no murmurs/rubs/gallops, no peripheral edema  LUNG: CTAB, no wheezes/rales/ronchi  SKIN: no rashes, no acanthosis  MSK: no deformities, full ROM of all extremities  NEURO: no tremors, DTR normal  PSYCH: AOX3, appropriate mood, affect normal      Results Review:     I reviewed the patient's new clinical results.      Lab Results   Component Value Date    GLUCOSE 89 07/29/2019    BUN 15 01/14/2020    CREATININE 0.96 01/14/2020    EGFRIFNONA 76 01/14/2020    EGFRIFAFRI 88 01/14/2020    BCR 16 01/14/2020    K 4.2 01/14/2020    CO2 24 01/14/2020    CALCIUM 9.5 01/14/2020    PROTENTOTREF 6.7 01/14/2020    ALBUMIN 4.6 01/14/2020    LABIL2 2.2 01/14/2020    AST 23 01/14/2020    ALT 26 01/14/2020     Lab Results   Component Value Date    TSH 6.880 (H) 05/19/2020    FREET4 1.60 05/19/2020     Tissue Pathology Exam: [id number]   Order:  [id number]   Collected:  8/7/2019 09:23   Status:  Final result   Visible to patient:  No (Not Released)   Dx:  Hyperthyroidism   Component    Final Diagnosis   1. Thyroid, Total Thyroidectomy (49 grams):               A. PAPILLARY MICROCARCINOMA, CLASSIC TYPE, measuring 0.5 cm (see Synoptic Report and Comment).               B. Margins are negative for carcinoma; Closest distance: 1 mm from posterior margin.                C. Negative for vascular and lymphatic space invasion.               D. Negative for extrathyroidal extension.               E. Background thyroid with benign adenomatoid nodules (largest is 1.5 cm in greatest dimension) and                   associated procedure-related changes.    Electronically signed by Mary Lou Scott MD on 8/9/2019 at 0857               Medication Review: Reviewed.       Current Outpatient Medications:   •  Calcium Carbonate-Vit D-Min (CALCIUM 1200 PO), Take 1,200 mg by mouth Daily., Disp: , Rfl:   •  Cholecalciferol (VITAMIN D3) 50 MCG (2000 UT) tablet, TAKE 1 TABLET BY MOUTH DAILY, Disp: 30 tablet, Rfl: 3  •  levothyroxine (SYNTHROID, LEVOTHROID) 200 MCG tablet, Take 1 tablet by mouth Daily., Disp: 30 tablet, Rfl: 5      Assessment/Plan   1.  Papillary microcarcinoma of thyroid: Status post total thyroidectomy.  Given that the tumor was only 5 mm and things were clear, I do not think that she needs radioactive iodine treatment at this time.  Risk for recurrence is low.  Stage I disease.  TG panel on January 20 was 0.1 with negative antibodies.  Will follow TG panel.    2.  Hypothyroidism secondary to total thyroidectomy: On levothyroxine supplementation, will follow TSH and free T4 and make further recommendations as needed.      3. Hypocalcemia: She is currently on calcium with vitamin D supplementation, will follow CMP.  Her PTH level was normal.  She was diagnosed with kidney stones, I will check PTH with CMP along with phosphorus, vitamin D and 24 urine for calcium  "and creatinine.            Jeronimo Poe MD FACE.  06/15/19  4:34 PM      EMR Dragon / transcription disclaimer:     \"Dictated utilizing Dragon dictation\".                 "

## 2020-07-16 NOTE — PATIENT INSTRUCTIONS
Please consider to stop smoking  Continue current dose of levothyroxine at 200 mcg p.o. daily  Check labs in the next few days  Follow-up in 6 months.

## 2020-08-05 ENCOUNTER — HOSPITAL ENCOUNTER (OUTPATIENT)
Dept: GENERAL RADIOLOGY | Facility: HOSPITAL | Age: 35
Discharge: HOME | End: 2020-08-05
Admitting: UROLOGY

## 2020-08-05 DIAGNOSIS — N20.0 RIGHT RENAL STONE: ICD-10-CM

## 2020-08-05 PROCEDURE — 74018 RADEX ABDOMEN 1 VIEW: CPT

## 2020-10-31 RX ORDER — CHOLECALCIFEROL (VITAMIN D3) 125 MCG
1 CAPSULE ORAL DAILY
Qty: 30 TABLET | Refills: 3 | Status: SHIPPED | OUTPATIENT
Start: 2020-10-31 | End: 2020-12-01 | Stop reason: SDUPTHER

## 2020-11-04 ENCOUNTER — HOSPITAL ENCOUNTER (OUTPATIENT)
Dept: GENERAL RADIOLOGY | Facility: HOSPITAL | Age: 35
Discharge: HOME | End: 2020-11-04
Admitting: UROLOGY

## 2020-11-04 DIAGNOSIS — N20.0 CALCULUS, RENAL: ICD-10-CM

## 2020-11-04 PROCEDURE — 74018 RADEX ABDOMEN 1 VIEW: CPT

## 2020-12-01 RX ORDER — CHOLECALCIFEROL (VITAMIN D3) 125 MCG
1 CAPSULE ORAL DAILY
Qty: 30 TABLET | Refills: 11 | Status: SHIPPED | OUTPATIENT
Start: 2020-12-01 | End: 2021-12-21

## 2020-12-08 RX ORDER — LEVOTHYROXINE SODIUM 0.2 MG/1
200 TABLET ORAL DAILY
Qty: 30 TABLET | Refills: 11 | Status: SHIPPED | OUTPATIENT
Start: 2020-12-08 | End: 2021-12-21

## 2021-01-12 ENCOUNTER — LAB (OUTPATIENT)
Dept: LAB | Facility: HOSPITAL | Age: 36
End: 2021-01-12

## 2021-01-12 ENCOUNTER — OFFICE VISIT (OUTPATIENT)
Dept: ENDOCRINOLOGY | Facility: CLINIC | Age: 36
End: 2021-01-12

## 2021-01-12 VITALS
TEMPERATURE: 97.5 F | WEIGHT: 187 LBS | DIASTOLIC BLOOD PRESSURE: 72 MMHG | HEART RATE: 92 BPM | HEIGHT: 63 IN | OXYGEN SATURATION: 99 % | SYSTOLIC BLOOD PRESSURE: 110 MMHG | BODY MASS INDEX: 33.13 KG/M2

## 2021-01-12 DIAGNOSIS — E89.0 POSTOPERATIVE HYPOTHYROIDISM: ICD-10-CM

## 2021-01-12 DIAGNOSIS — E83.51 HYPOCALCEMIA: ICD-10-CM

## 2021-01-12 DIAGNOSIS — C73 THYROID CANCER (HCC): ICD-10-CM

## 2021-01-12 DIAGNOSIS — C73 THYROID CANCER (HCC): Primary | ICD-10-CM

## 2021-01-12 LAB
25(OH)D3 SERPL-MCNC: 47.2 NG/ML (ref 30–100)
ALBUMIN SERPL-MCNC: 4.3 G/DL (ref 3.5–5.2)
ALBUMIN/GLOB SERPL: 1.9 G/DL
ALP SERPL-CCNC: 55 U/L (ref 39–117)
ALT SERPL W P-5'-P-CCNC: 31 U/L (ref 1–33)
ANION GAP SERPL CALCULATED.3IONS-SCNC: 7.2 MMOL/L (ref 5–15)
AST SERPL-CCNC: 25 U/L (ref 1–32)
BILIRUB SERPL-MCNC: 0.3 MG/DL (ref 0–1.2)
BUN SERPL-MCNC: 13 MG/DL (ref 6–20)
BUN/CREAT SERPL: 14.9 (ref 7–25)
CALCIUM SPEC-SCNC: 9.3 MG/DL (ref 8.6–10.5)
CHLORIDE SERPL-SCNC: 102 MMOL/L (ref 98–107)
CO2 SERPL-SCNC: 26.8 MMOL/L (ref 22–29)
CREAT SERPL-MCNC: 0.87 MG/DL (ref 0.57–1)
GFR SERPL CREATININE-BSD FRML MDRD: 73 ML/MIN/1.73
GLOBULIN UR ELPH-MCNC: 2.3 GM/DL
GLUCOSE SERPL-MCNC: 83 MG/DL (ref 65–99)
PHOSPHATE SERPL-MCNC: 3.2 MG/DL (ref 2.5–4.5)
POTASSIUM SERPL-SCNC: 3.8 MMOL/L (ref 3.5–5.2)
PROT SERPL-MCNC: 6.6 G/DL (ref 6–8.5)
PTH-INTACT SERPL-MCNC: 26.4 PG/ML (ref 15–65)
SODIUM SERPL-SCNC: 136 MMOL/L (ref 136–145)
T4 FREE SERPL-MCNC: 1.24 NG/DL (ref 0.93–1.7)
TSH SERPL DL<=0.05 MIU/L-ACNC: 11.8 UIU/ML (ref 0.27–4.2)

## 2021-01-12 PROCEDURE — 84443 ASSAY THYROID STIM HORMONE: CPT

## 2021-01-12 PROCEDURE — 83970 ASSAY OF PARATHORMONE: CPT

## 2021-01-12 PROCEDURE — 80053 COMPREHEN METABOLIC PANEL: CPT

## 2021-01-12 PROCEDURE — 86800 THYROGLOBULIN ANTIBODY: CPT

## 2021-01-12 PROCEDURE — 84432 ASSAY OF THYROGLOBULIN: CPT

## 2021-01-12 PROCEDURE — 82306 VITAMIN D 25 HYDROXY: CPT

## 2021-01-12 PROCEDURE — 99214 OFFICE O/P EST MOD 30 MIN: CPT | Performed by: INTERNAL MEDICINE

## 2021-01-12 PROCEDURE — 36415 COLL VENOUS BLD VENIPUNCTURE: CPT

## 2021-01-12 PROCEDURE — 84439 ASSAY OF FREE THYROXINE: CPT

## 2021-01-12 PROCEDURE — 84100 ASSAY OF PHOSPHORUS: CPT

## 2021-01-12 RX ORDER — LEVOTHYROXINE SODIUM 0.15 MG/1
150 TABLET ORAL DAILY
COMMUNITY
Start: 2020-12-20 | End: 2021-01-12

## 2021-01-12 RX ORDER — ALBUTEROL SULFATE 90 UG/1
AEROSOL, METERED RESPIRATORY (INHALATION)
COMMUNITY
Start: 2020-10-07

## 2021-01-12 RX ORDER — CYCLOBENZAPRINE HCL 10 MG
TABLET ORAL
COMMUNITY
Start: 2020-12-02 | End: 2022-01-14

## 2021-01-12 RX ORDER — METHYLPREDNISOLONE 4 MG/1
TABLET ORAL
COMMUNITY
Start: 2020-12-02 | End: 2021-01-12

## 2021-01-12 RX ORDER — DICLOFENAC EPOLAMINE 0.01 G/1
SYSTEM TOPICAL
COMMUNITY
End: 2022-01-14

## 2021-01-12 NOTE — PROGRESS NOTES
Endocrine Progress Note Outpatient     Patient Care Team:  Provider, No Known as PCP - General  Provider, No Known as PCP - Family Medicine    Chief Complaint: Follow-up hypothyroidism    HPI: 35-year-old female with history of hypothyroidism underwent total thyroidectomy which was done on August 7, 2019.  Pathology showed a 5 mm papillary thyroid cancer, margins were uninvolved, she was not treated with radioactive iodine because the risk was very low for recurrence and residual disease.  She is currently taking levothyroxine 200 mcg p.o. daily.      Hypocalcemia: She has been taking some calcium supplementation.  She was diagnosed with kidney stones couple of weeks ago.    Hypothyroidism: Postop, on levothyroxine 200 mcg p.o. daily.        Past Medical History:   Diagnosis Date   • Arthritis    • History of abnormal cervical Pap smear    • History of migraine     AS A CHILD   • History of MRSA infection     UNDER LEFT GLUTEAL FOLD, TREATED AT [facility] IN [place], APPROX. 7 YEARS AGO   • Hyperthyroidism    • Lumbar disc disease    • Neck pain    • Oropharyngeal dysphagia    • Papillary microcarcinoma of thyroid (CMS/[facility]) 08/09/2019    s/p Total Thyroidectomy   • Thyroid nodule        Social History     Socioeconomic History   • Marital status:      Spouse name: Not on file   • Number of children: 2   • Years of education: Not on file   • Highest education level: GED or equivalent   Occupational History   • Occupation:      Employer: [employer]   Tobacco Use   • Smoking status: Current Every Day Smoker     Packs/day: 0.50     Years: 20.00     Pack years: 10.00   • Smokeless tobacco: Never Used   Substance and Sexual Activity   • Alcohol use: No     Frequency: Never   • Drug use: No   • Sexual activity: Defer       Family History   Problem Relation Age of Onset   • Breast cancer Mother    • Diabetes Mother    • Cervical cancer Mother    • Cervical cancer  Maternal Grandmother    • Diabetes Other         grandmother   • Hypertension Other         grandmother   • Stroke Other         grandfather   • Malig Hyperthermia Neg Hx        Allergies   Allergen Reactions   • Penicillins Anaphylaxis     ALL 'CILLIN' FAMILY   • Gabapentin GI Intolerance   • Latex Rash   • Tramadol GI Intolerance   • Diphenhydramine Hives and Itching   • Morphine Hives       ROS:   Constitutional:  Admit fatigue, tiredness.    Eyes:  Denies change in visual acuity   HENT:  Denies nasal congestion or sore throat   Respiratory: denies cough, Denies shortness of breath.   Cardiovascular:  denies chest pain, edema   GI:  Denies abdominal pain, admit nausea, denies vomiting.   Musculoskeletal:  Denies back pain or joint pain   Integument:  Denies dry skin and rash   Neurologic:  Denies headache, focal weakness or sensory changes   Endocrine:  Denies polyuria or polydipsia   Psychiatric:  Admit depression, denies anxiety      Vitals:    01/12/21 1523   BP: 110/72   Pulse: 92   Temp: 97.5 °F (36.4 °C)   SpO2: 99%       Physical Exam:  GEN: NAD, conversant  EYES: EOMI, PERRL, no conjunctival erythema  NECK: no thyromegaly, full ROM   CV: RRR, no murmurs/rubs/gallops, no peripheral edema  LUNG: CTAB, no wheezes/rales/ronchi  SKIN: no rashes, no acanthosis  MSK: no deformities, full ROM of all extremities  NEURO: no tremors, DTR normal  PSYCH: AOX3, appropriate mood, affect normal      Results Review:        Labs were drawn today and are pending.      Medication Review: Reviewed.       Current Outpatient Medications:   •  albuterol sulfate  (90 Base) MCG/ACT inhaler, INL 1 TO 2 PUFFS PO Q 6 H PRN FOR 7 DAYS, Disp: , Rfl:   •  Calcium Carbonate-Vitamin D (calcium-vitamin D) 500-200 MG-UNIT tablet per tablet, Take 500 mg by mouth 2 (Two) Times a Day., Disp: , Rfl:   •  Cholecalciferol (Vitamin D3) 50 MCG (2000 UT) tablet, Take 1 tablet by mouth Daily., Disp: 30 tablet, Rfl: 11  •  cyclobenzaprine  "(FLEXERIL) 10 MG tablet, TK 1 T PO TID PRF SPASM, Disp: , Rfl:   •  Diclofenac Epolamine (Flector) 1.3 % patch patch, Flector 1.3 % transdermal 12 hour patch  Apply 1 patch every day by transdermal route as needed for 30 days., Disp: , Rfl:   •  levothyroxine (SYNTHROID, LEVOTHROID) 200 MCG tablet, Take 1 tablet by mouth Daily., Disp: 30 tablet, Rfl: 11      Assessment/Plan   1.  Papillary microcarcinoma of thyroid: Status post total thyroidectomy.  Given that the tumor was only 5 mm and things were clear, I do not think that she needs radioactive iodine treatment at this time.  Risk for recurrence is low.  Stage I disease.  TG panel pending.    2.  Hypothyroidism secondary to total thyroidectomy: On levothyroxine supplementation, labs drawn today, pending.  Will follow labs and make further recommendations as needed.    3. Hypocalcemia: Follow CMP.  She is currently on calcium and vitamin D supplementation.            Jeronimo Poe MD FACE.        EMR Dragon / transcription disclaimer:     \"Dictated utilizing Dragon dictation\".                 "

## 2021-01-12 NOTE — PATIENT INSTRUCTIONS
Please stop smoking  Continue current medication  Follow-up with your family physician for the knot on the back of your neck  Follow-up in 1 year.

## 2021-01-15 LAB
THYROGLOB AB SERPL-ACNC: <1 IU/ML
THYROGLOB SERPL-MCNC: 0.5 NG/ML
THYROGLOB SERPL-MCNC: NORMAL NG/ML

## 2021-01-18 DIAGNOSIS — E89.0 POSTOPERATIVE HYPOTHYROIDISM: Primary | ICD-10-CM

## 2021-01-18 DIAGNOSIS — E83.51 HYPOCALCEMIA: ICD-10-CM

## 2021-01-18 RX ORDER — LEVOTHYROXINE SODIUM 0.05 MG/1
50 TABLET ORAL DAILY
Qty: 30 TABLET | Refills: 11 | Status: SHIPPED | OUTPATIENT
Start: 2021-01-18 | End: 2022-01-18 | Stop reason: HOSPADM

## 2021-01-19 NOTE — TELEPHONE ENCOUNTER
LVM for patient advising Rx should be filled by Dr. Poe. Called [facility]. Spoke with Tiller. She will route to Dr. Poe.

## 2021-12-21 RX ORDER — LEVOTHYROXINE SODIUM 0.2 MG/1
200 TABLET ORAL DAILY
Qty: 30 TABLET | Refills: 11 | Status: SHIPPED | OUTPATIENT
Start: 2021-12-21 | End: 2022-01-18 | Stop reason: HOSPADM

## 2021-12-21 RX ORDER — CHOLECALCIFEROL (VITAMIN D3) 125 MCG
1 CAPSULE ORAL DAILY
Qty: 30 TABLET | Refills: 11 | Status: SHIPPED | OUTPATIENT
Start: 2021-12-21 | End: 2022-06-01

## 2022-01-06 ENCOUNTER — TELEPHONE (OUTPATIENT)
Dept: ENDOCRINOLOGY | Facility: CLINIC | Age: 37
End: 2022-01-06

## 2022-01-06 DIAGNOSIS — E83.51 HYPOCALCEMIA: Primary | ICD-10-CM

## 2022-01-06 NOTE — TELEPHONE ENCOUNTER
[facility] fax [phone] phone  [phone]. Calcium is  11.0 on two supplement They are asking for your  recommendation to address it. Surgery is on 01/12/2022 Cervical fusion. They are faxing over the Lab for you to review.

## 2022-01-10 ENCOUNTER — LAB (OUTPATIENT)
Dept: LAB | Facility: HOSPITAL | Age: 37
End: 2022-01-10

## 2022-01-10 DIAGNOSIS — C73 THYROID CANCER: ICD-10-CM

## 2022-01-10 DIAGNOSIS — E83.51 HYPOCALCEMIA: ICD-10-CM

## 2022-01-10 DIAGNOSIS — E89.0 POSTOPERATIVE HYPOTHYROIDISM: ICD-10-CM

## 2022-01-10 LAB
ALBUMIN SERPL-MCNC: 4.5 G/DL (ref 3.5–5.2)
ALBUMIN/GLOB SERPL: 2 G/DL
ALP SERPL-CCNC: 72 U/L (ref 39–117)
ALT SERPL W P-5'-P-CCNC: 109 U/L (ref 1–33)
ANION GAP SERPL CALCULATED.3IONS-SCNC: 7.3 MMOL/L (ref 5–15)
AST SERPL-CCNC: 86 U/L (ref 1–32)
BASOPHILS # BLD AUTO: 0.05 10*3/MM3 (ref 0–0.2)
BASOPHILS NFR BLD AUTO: 0.8 % (ref 0–1.5)
BILIRUB SERPL-MCNC: 0.2 MG/DL (ref 0–1.2)
BUN SERPL-MCNC: 9 MG/DL (ref 6–20)
BUN/CREAT SERPL: 11.1 (ref 7–25)
CALCIUM SPEC-SCNC: 9.1 MG/DL (ref 8.6–10.5)
CHLORIDE SERPL-SCNC: 105 MMOL/L (ref 98–107)
CO2 SERPL-SCNC: 25.7 MMOL/L (ref 22–29)
CREAT SERPL-MCNC: 0.81 MG/DL (ref 0.57–1)
DEPRECATED RDW RBC AUTO: 49.1 FL (ref 37–54)
EOSINOPHIL # BLD AUTO: 0.14 10*3/MM3 (ref 0–0.4)
EOSINOPHIL NFR BLD AUTO: 2.2 % (ref 0.3–6.2)
ERYTHROCYTE [DISTWIDTH] IN BLOOD BY AUTOMATED COUNT: 13.5 % (ref 12.3–15.4)
GFR SERPL CREATININE-BSD FRML MDRD: 79 ML/MIN/1.73
GLOBULIN UR ELPH-MCNC: 2.2 GM/DL
GLUCOSE SERPL-MCNC: 79 MG/DL (ref 65–99)
HCT VFR BLD AUTO: 44.9 % (ref 34–46.6)
HGB BLD-MCNC: 15.2 G/DL (ref 12–15.9)
IMM GRANULOCYTES # BLD AUTO: 0.04 10*3/MM3 (ref 0–0.05)
IMM GRANULOCYTES NFR BLD AUTO: 0.6 % (ref 0–0.5)
LYMPHOCYTES # BLD AUTO: 2.19 10*3/MM3 (ref 0.7–3.1)
LYMPHOCYTES NFR BLD AUTO: 34.3 % (ref 19.6–45.3)
MCH RBC QN AUTO: 33.6 PG (ref 26.6–33)
MCHC RBC AUTO-ENTMCNC: 33.9 G/DL (ref 31.5–35.7)
MCV RBC AUTO: 99.3 FL (ref 79–97)
MONOCYTES # BLD AUTO: 0.37 10*3/MM3 (ref 0.1–0.9)
MONOCYTES NFR BLD AUTO: 5.8 % (ref 5–12)
NEUTROPHILS NFR BLD AUTO: 3.59 10*3/MM3 (ref 1.7–7)
NEUTROPHILS NFR BLD AUTO: 56.3 % (ref 42.7–76)
NRBC BLD AUTO-RTO: 0 /100 WBC (ref 0–0.2)
PLATELET # BLD AUTO: 274 10*3/MM3 (ref 140–450)
PMV BLD AUTO: 10.8 FL (ref 6–12)
POTASSIUM SERPL-SCNC: 3.8 MMOL/L (ref 3.5–5.2)
PROT SERPL-MCNC: 6.7 G/DL (ref 6–8.5)
RBC # BLD AUTO: 4.52 10*6/MM3 (ref 3.77–5.28)
SODIUM SERPL-SCNC: 138 MMOL/L (ref 136–145)
T4 FREE SERPL-MCNC: 1.24 NG/DL (ref 0.93–1.7)
TSH SERPL DL<=0.05 MIU/L-ACNC: 25.4 UIU/ML (ref 0.27–4.2)
WBC NRBC COR # BLD: 6.38 10*3/MM3 (ref 3.4–10.8)

## 2022-01-10 PROCEDURE — 80053 COMPREHEN METABOLIC PANEL: CPT

## 2022-01-10 PROCEDURE — 84439 ASSAY OF FREE THYROXINE: CPT

## 2022-01-10 PROCEDURE — 85025 COMPLETE CBC W/AUTO DIFF WBC: CPT

## 2022-01-10 PROCEDURE — 86800 THYROGLOBULIN ANTIBODY: CPT

## 2022-01-10 PROCEDURE — 84443 ASSAY THYROID STIM HORMONE: CPT

## 2022-01-10 PROCEDURE — 84432 ASSAY OF THYROGLOBULIN: CPT

## 2022-01-10 PROCEDURE — 36415 COLL VENOUS BLD VENIPUNCTURE: CPT

## 2022-01-13 PROBLEM — M50.10 HERNIATION OF CERVICAL INTERVERTEBRAL DISC WITH RADICULOPATHY: Status: ACTIVE | Noted: 2021-12-28

## 2022-01-13 PROBLEM — G56.03 CARPAL TUNNEL SYNDROME, BILATERAL: Status: ACTIVE | Noted: 2021-12-02

## 2022-01-13 LAB
THYROGLOB AB SERPL-ACNC: <1 IU/ML
THYROGLOB SERPL-MCNC: 1.3 NG/ML
THYROGLOB SERPL-MCNC: NORMAL NG/ML

## 2022-01-13 RX ORDER — LIDOCAINE 36 MG/1
PATCH TOPICAL
Status: ON HOLD | COMMUNITY
Start: 2021-11-26 | End: 2022-08-28

## 2022-01-13 RX ORDER — CALCIUM CARBONATE/VITAMIN D3 500MG-5MCG
1 TABLET ORAL 2 TIMES DAILY
COMMUNITY
Start: 2021-10-26 | End: 2022-07-29 | Stop reason: SDUPTHER

## 2022-01-13 RX ORDER — HYDROCODONE BITARTRATE AND ACETAMINOPHEN 7.5; 325 MG/1; MG/1
1 TABLET ORAL 3 TIMES DAILY PRN
COMMUNITY
Start: 2021-12-30

## 2022-01-13 RX ORDER — AMITRIPTYLINE HYDROCHLORIDE 10 MG/1
TABLET, FILM COATED ORAL
COMMUNITY
Start: 2021-12-28

## 2022-01-13 RX ORDER — CLINDAMYCIN HYDROCHLORIDE 150 MG/1
CAPSULE ORAL
COMMUNITY
Start: 2021-12-07 | End: 2022-01-14

## 2022-01-13 RX ORDER — METHOCARBAMOL 750 MG/1
TABLET, FILM COATED ORAL
Status: ON HOLD | COMMUNITY
Start: 2021-12-28 | End: 2022-08-28

## 2022-01-14 ENCOUNTER — TELEMEDICINE (OUTPATIENT)
Dept: ENDOCRINOLOGY | Facility: CLINIC | Age: 37
End: 2022-01-14

## 2022-01-14 VITALS — WEIGHT: 195 LBS | BODY MASS INDEX: 34.55 KG/M2 | HEIGHT: 63 IN

## 2022-01-14 DIAGNOSIS — C73 THYROID CANCER: Primary | ICD-10-CM

## 2022-01-14 DIAGNOSIS — R74.8 ELEVATED LIVER ENZYMES: ICD-10-CM

## 2022-01-14 DIAGNOSIS — E89.0 POSTOPERATIVE HYPOTHYROIDISM: ICD-10-CM

## 2022-01-14 DIAGNOSIS — E83.51 HYPOCALCEMIA: ICD-10-CM

## 2022-01-14 PROCEDURE — 99214 OFFICE O/P EST MOD 30 MIN: CPT | Performed by: INTERNAL MEDICINE

## 2022-01-14 NOTE — PROGRESS NOTES
Endocrine Progress Note Outpatient     Patient Care Team:  Provider, No Known as PCP - General  Provider, No Known as PCP - Family Medicine  You have chosen to receive care through a telehealth visit.  Do you consent to use a video/audio connection for your medical care today? Yes.     Chief Complaint: Follow-up thyroid cancer and hypothyroidism: Visit conducted through [facility]          HPI: This is 36-year-old female with history of hypothyroidism secondary to total thyroidectomy done in August 2019. Pathology showed a 5 mm papillary thyroid cancer. Margins were clear she and she was not treated with radioactive iodine. She is currently taking levothyroxine to 50 mcg p.o. daily. She tells me that she is taking it along with other her calcium and vitamin D.  Hypocalcemia: Currently on calcium and vitamin D supplementation  Hypothyroidism: Currently on levothyroxine to 50 mcg p.o. daily.    She does admit fatigue and tiredness.    Past Medical History:   Diagnosis Date   • Arthritis    • History of abnormal cervical Pap smear    • History of migraine     AS A CHILD   • History of MRSA infection     UNDER LEFT GLUTEAL FOLD, TREATED AT [facility] IN [place], APPROX. 7 YEARS AGO   • Hyperthyroidism    • Lumbar disc disease    • Neck pain    • Oropharyngeal dysphagia    • Papillary microcarcinoma of thyroid (HCC) 08/09/2019    s/p Total Thyroidectomy   • Thyroid nodule        Social History     Socioeconomic History   • Marital status:    • Number of children: 2   • Highest education level: GED or equivalent   Tobacco Use   • Smoking status: Former Smoker     Packs/day: 0.50     Years: 20.00     Pack years: 10.00   • Smokeless tobacco: Never Used   Vaping Use   • Vaping Use: Never used   Substance and Sexual Activity   • Alcohol use: No   • Drug use: No   • Sexual activity: Defer       Family History   Problem Relation Age of Onset   • Breast cancer Mother    • Diabetes Mother    • Cervical cancer  Mother    • Cervical cancer Maternal Grandmother    • Diabetes Other         grandmother   • Hypertension Other         grandmother   • Stroke Other         grandfather   • Malig Hyperthermia Neg Hx        Allergies   Allergen Reactions   • Penicillins Anaphylaxis     ALL 'CILLIN' FAMILY   • Gabapentin GI Intolerance   • Latex Rash   • Tramadol GI Intolerance   • Diphenhydramine Hives and Itching   • Morphine Hives       ROS:   Constitutional:  Admit fatigue, tiredness.    Eyes:  Denies change in visual acuity   HENT:  Denies nasal congestion or sore throat   Respiratory: denies cough, shortness of breath.   Cardiovascular:  denies chest pain, edema   GI:  Denies abdominal pain, nausea, vomiting.   Musculoskeletal:  Denies back pain or joint pain   Integument:  Denies dry skin and rash   Neurologic:  Denies headache, focal weakness or sensory changes   Endocrine:  Denies polyuria or polydipsia   Psychiatric:  Denies depression or anxiety      There were no vitals filed for this visit.  Body mass index is 34.54 kg/m².     Physical Exam:  GEN: NAD, conversant  PSYCH: AOX3, appropriate mood, affect normal      Results Review:     I reviewed the patient's new clinical results.    Lab Results   Component Value Date    HGBA1C 4.9 04/27/2021      Lab Results   Component Value Date    GLUCOSE 79 01/10/2022    BUN 9 01/10/2022    CREATININE 0.81 01/10/2022    EGFRIFNONA 79 01/10/2022    EGFRIFAFRI 88 01/14/2020    BCR 11.1 01/10/2022    K 3.8 01/10/2022    CO2 25.7 01/10/2022    CALCIUM 9.1 01/10/2022    PROTENTOTREF 6.7 01/14/2020    ALBUMIN 4.50 01/10/2022    LABIL2 2.2 01/14/2020    AST 86 (H) 01/10/2022     (H) 01/10/2022     Lab Results   Component Value Date    TSH 25.400 (H) 01/10/2022    FREET4 1.24 01/10/2022         Medication Review: Reviewed.       Current Outpatient Medications:   •  albuterol sulfate  (90 Base) MCG/ACT inhaler, INL 1 TO 2 PUFFS PO Q 6 H PRN FOR 7 DAYS, Disp: , Rfl:   •   amitriptyline (ELAVIL) 10 MG tablet, TAKE 1 TABLET BY MOUTH EVERY NIGHT AS NEEDED, Disp: , Rfl:   •  Diclofenac Epolamine (Flector) 1.3 % patch patch, Flector 1.3 % transdermal 12 hour patch  Apply 1 patch every day by transdermal route as needed for 30 days., Disp: , Rfl:   •  HYDROcodone-acetaminophen (NORCO) 5-325 MG per tablet, Take 1 tablet by mouth 3 (Three) Times a Day As Needed. for pain, Disp: , Rfl:   •  levothyroxine (SYNTHROID, LEVOTHROID) 200 MCG tablet, TAKE 1 TABLET BY MOUTH DAILY, Disp: 30 tablet, Rfl: 11  •  levothyroxine (SYNTHROID, LEVOTHROID) 50 MCG tablet, Take 1 tablet by mouth Daily., Disp: 30 tablet, Rfl: 11  •  methocarbamol (ROBAXIN) 750 MG tablet, TAKE 1 TABLET BY MOUTH AT BEDTIME AS NEEDED FOR SPASMS, Disp: , Rfl:   •  ZTlido 1.8 % patch, UNWRAP AND APPLY 1 PATCH TO SKIN 12 HOURS PER DAY AS DIRECTED. NO PATCH FOR 12 HOURS BEFORE NEXT APPLICATION AS NEEDED, Disp: , Rfl:   •  Calcium Carbonate-Vitamin D (calcium-vitamin D) 500-200 MG-UNIT tablet per tablet, Take 1 tablet by mouth 2 (Two) Times a Day., Disp: 180 tablet, Rfl: 2  •  Cholecalciferol (Vitamin D3) 50 MCG (2000 UT) tablet, TAKE 1 TABLET BY MOUTH DAILY, Disp: 30 tablet, Rfl: 11  •  clindamycin (CLEOCIN) 150 MG capsule, TAKE 1 CAPSULE BY MOUTH FOUR TIMES DAILY UNTIL GONE, Disp: , Rfl:   •  cyclobenzaprine (FLEXERIL) 10 MG tablet, TK 1 T PO TID PRF SPASM, Disp: , Rfl:   •  mupirocin (BACTROBAN) 2 % ointment, , Disp: , Rfl:   •  OYSCO 500 + D 500-200 MG-UNIT tablet, Take 1 tablet by mouth 2 (Two) Times a Day., Disp: , Rfl:       Assessment/Plan   1.  Hypothyroidism: Uncontrolled with very high TSH of 25, she is taking her thyroid pill along with other medications including calcium and vitamin D. At this time I advised her to change levothyroxine to 300 mcg daily and take it by itself with water at least 1 hour before or after other pills and food. She verbalized understanding and will follow TSH and free T4.  2. Thyroid cancer: Status  post thyroid surgery, she had a 5 mm PTC, margins were uninvolved and she did not receive radioactive iodine treatment as she was deemed to be low risk. TG panel is mildly elevated, this could be due to uncontrolled TSH, will follow TG panel.  3. Hypocalcemia: Change calcium to 1 tablet p.o. daily along with vitamin D to 2000 units p.o. daily and follow CMP  4. Elevated liver enzymes: Her AST and ALT are high, she is on hydrocodone, she does not drink alcohol, will recheck CMP, advised to decrease Tylenol intake. We will follow CMP.            Jeronimo Poe MD FACE.

## 2022-01-14 NOTE — PATIENT INSTRUCTIONS
Change levothyroxine to 300 mcg p.o. daily and take thyroid pill by itself with water at least 1 hour before or after other pills and food.  Decrease your calcium to 1 tablet p.o. daily  Continue vitamin D 2000 units p.o. daily  Check TSH and free T4 in 6 weeks  Check TSH, free T4, CMP and TG panel in 6 months  Check CMP in 6 weeks as well.

## 2022-01-18 RX ORDER — LEVOTHYROXINE SODIUM 300 UG/1
300 TABLET ORAL DAILY
Qty: 30 TABLET | Refills: 5 | Status: SHIPPED | OUTPATIENT
Start: 2022-01-18 | End: 2022-03-11

## 2022-03-10 ENCOUNTER — LAB (OUTPATIENT)
Dept: LAB | Facility: HOSPITAL | Age: 37
End: 2022-03-10

## 2022-03-10 DIAGNOSIS — E89.0 POSTOPERATIVE HYPOTHYROIDISM: ICD-10-CM

## 2022-03-10 DIAGNOSIS — C73 THYROID CANCER: ICD-10-CM

## 2022-03-10 DIAGNOSIS — E83.51 HYPOCALCEMIA: ICD-10-CM

## 2022-03-10 LAB
ALBUMIN SERPL-MCNC: 3.9 G/DL (ref 3.5–5.2)
ALBUMIN/GLOB SERPL: 1.3 G/DL
ALP SERPL-CCNC: 96 U/L (ref 39–117)
ALT SERPL W P-5'-P-CCNC: 50 U/L (ref 1–33)
ANION GAP SERPL CALCULATED.3IONS-SCNC: 11.2 MMOL/L (ref 5–15)
AST SERPL-CCNC: 20 U/L (ref 1–32)
BILIRUB SERPL-MCNC: 0.3 MG/DL (ref 0–1.2)
BUN SERPL-MCNC: 11 MG/DL (ref 6–20)
BUN/CREAT SERPL: 13.3 (ref 7–25)
CALCIUM SPEC-SCNC: 9.6 MG/DL (ref 8.6–10.5)
CHLORIDE SERPL-SCNC: 105 MMOL/L (ref 98–107)
CO2 SERPL-SCNC: 22.8 MMOL/L (ref 22–29)
CREAT SERPL-MCNC: 0.83 MG/DL (ref 0.57–1)
EGFRCR SERPLBLD CKD-EPI 2021: 92.7 ML/MIN/1.73
GLOBULIN UR ELPH-MCNC: 2.9 GM/DL
GLUCOSE SERPL-MCNC: 92 MG/DL (ref 65–99)
POTASSIUM SERPL-SCNC: 4.3 MMOL/L (ref 3.5–5.2)
PROT SERPL-MCNC: 6.8 G/DL (ref 6–8.5)
SODIUM SERPL-SCNC: 139 MMOL/L (ref 136–145)
T4 FREE SERPL-MCNC: 2.47 NG/DL (ref 0.93–1.7)
TSH SERPL DL<=0.05 MIU/L-ACNC: 0.02 UIU/ML (ref 0.27–4.2)

## 2022-03-10 PROCEDURE — 84439 ASSAY OF FREE THYROXINE: CPT

## 2022-03-10 PROCEDURE — 84443 ASSAY THYROID STIM HORMONE: CPT

## 2022-03-10 PROCEDURE — 36415 COLL VENOUS BLD VENIPUNCTURE: CPT

## 2022-03-10 PROCEDURE — 80053 COMPREHEN METABOLIC PANEL: CPT

## 2022-03-11 DIAGNOSIS — E89.0 POSTOPERATIVE HYPOTHYROIDISM: Primary | ICD-10-CM

## 2022-03-11 RX ORDER — LEVOTHYROXINE SODIUM 0.05 MG/1
50 TABLET ORAL DAILY
Qty: 30 TABLET | Refills: 6 | Status: SHIPPED | OUTPATIENT
Start: 2022-03-11 | End: 2022-06-01

## 2022-03-11 RX ORDER — LEVOTHYROXINE SODIUM 0.2 MG/1
200 TABLET ORAL DAILY
Qty: 30 TABLET | Refills: 6 | Status: SHIPPED | OUTPATIENT
Start: 2022-03-11 | End: 2022-06-01

## 2022-06-01 RX ORDER — LEVOTHYROXINE SODIUM 0.2 MG/1
TABLET ORAL
Qty: 30 TABLET | Refills: 2 | Status: SHIPPED | OUTPATIENT
Start: 2022-06-01 | End: 2022-07-15

## 2022-06-01 RX ORDER — LEVOTHYROXINE SODIUM 0.05 MG/1
TABLET ORAL
Qty: 30 TABLET | Refills: 1 | Status: SHIPPED | OUTPATIENT
Start: 2022-06-01 | End: 2022-06-28

## 2022-06-01 RX ORDER — CHOLECALCIFEROL (VITAMIN D3) 125 MCG
1 CAPSULE ORAL DAILY
Qty: 30 TABLET | Refills: 10 | Status: SHIPPED | OUTPATIENT
Start: 2022-06-01 | End: 2023-02-17

## 2022-06-28 RX ORDER — LEVOTHYROXINE SODIUM 0.05 MG/1
TABLET ORAL
Qty: 30 TABLET | Refills: 1 | Status: SHIPPED | OUTPATIENT
Start: 2022-06-28 | End: 2022-07-20 | Stop reason: SDUPTHER

## 2022-07-01 ENCOUNTER — TELEPHONE (OUTPATIENT)
Dept: ENDOCRINOLOGY | Facility: CLINIC | Age: 37
End: 2022-07-01

## 2022-07-01 NOTE — TELEPHONE ENCOUNTER
Tried to call patient regarding lab closure. No answer left vm stating that the appt at our office is cx'ed [facility] or contact our office to let know where to fax orders.

## 2022-07-05 RX ORDER — ACETAMINOPHEN AND CODEINE PHOSPHATE 300; 30 MG/1; MG/1
TABLET ORAL
Status: ON HOLD | COMMUNITY
Start: 2022-06-06 | End: 2022-08-28

## 2022-07-05 RX ORDER — LEVOTHYROXINE SODIUM 300 UG/1
1 TABLET ORAL DAILY
COMMUNITY
Start: 2022-01-18 | End: 2022-07-15

## 2022-07-05 RX ORDER — ONDANSETRON 4 MG/1
4 TABLET, FILM COATED ORAL EVERY 8 HOURS PRN
COMMUNITY
Start: 2022-04-18 | End: 2022-08-28 | Stop reason: HOSPADM

## 2022-07-12 NOTE — TELEPHONE ENCOUNTER
Patient has appt on 7/14/22. Will wait until then to see if any changes will be made. I spoke with the patient and she has not had labs done yet as they are having car trouble. She is planning to be here for the appointment and will try to get labs done that morning before the visit.

## 2022-07-15 RX ORDER — LEVOTHYROXINE SODIUM 0.2 MG/1
200 TABLET ORAL DAILY
Qty: 90 TABLET | Refills: 2 | Status: SHIPPED | OUTPATIENT
Start: 2022-07-15 | End: 2022-09-16

## 2022-07-18 ENCOUNTER — LAB (OUTPATIENT)
Dept: LAB | Facility: HOSPITAL | Age: 37
End: 2022-07-18

## 2022-07-18 DIAGNOSIS — E89.0 POSTOPERATIVE HYPOTHYROIDISM: ICD-10-CM

## 2022-07-18 DIAGNOSIS — C73 THYROID CANCER: ICD-10-CM

## 2022-07-18 LAB
ALBUMIN SERPL-MCNC: 3.5 G/DL (ref 3.5–5.2)
ALBUMIN/GLOB SERPL: 1 G/DL
ALP SERPL-CCNC: 99 U/L (ref 39–117)
ALT SERPL W P-5'-P-CCNC: 86 U/L (ref 1–33)
ANION GAP SERPL CALCULATED.3IONS-SCNC: 8.6 MMOL/L (ref 5–15)
AST SERPL-CCNC: 81 U/L (ref 1–32)
BILIRUB SERPL-MCNC: <0.2 MG/DL (ref 0–1.2)
BUN SERPL-MCNC: 14 MG/DL (ref 6–20)
BUN/CREAT SERPL: 14.1 (ref 7–25)
CALCIUM SPEC-SCNC: 9.9 MG/DL (ref 8.6–10.5)
CHLORIDE SERPL-SCNC: 101 MMOL/L (ref 98–107)
CO2 SERPL-SCNC: 28.4 MMOL/L (ref 22–29)
CREAT SERPL-MCNC: 0.99 MG/DL (ref 0.57–1)
EGFRCR SERPLBLD CKD-EPI 2021: 74.5 ML/MIN/1.73
GLOBULIN UR ELPH-MCNC: 3.5 GM/DL
GLUCOSE SERPL-MCNC: 84 MG/DL (ref 65–99)
POTASSIUM SERPL-SCNC: 4.7 MMOL/L (ref 3.5–5.2)
PROT SERPL-MCNC: 7 G/DL (ref 6–8.5)
SODIUM SERPL-SCNC: 138 MMOL/L (ref 136–145)
T4 FREE SERPL-MCNC: 1.46 NG/DL (ref 0.93–1.7)
TSH SERPL DL<=0.05 MIU/L-ACNC: 2.44 UIU/ML (ref 0.27–4.2)

## 2022-07-18 PROCEDURE — 80053 COMPREHEN METABOLIC PANEL: CPT

## 2022-07-18 PROCEDURE — 84432 ASSAY OF THYROGLOBULIN: CPT

## 2022-07-18 PROCEDURE — 84443 ASSAY THYROID STIM HORMONE: CPT

## 2022-07-18 PROCEDURE — 36415 COLL VENOUS BLD VENIPUNCTURE: CPT

## 2022-07-18 PROCEDURE — 86800 THYROGLOBULIN ANTIBODY: CPT

## 2022-07-18 PROCEDURE — 84439 ASSAY OF FREE THYROXINE: CPT

## 2022-07-20 DIAGNOSIS — E89.0 POSTOPERATIVE HYPOTHYROIDISM: ICD-10-CM

## 2022-07-20 DIAGNOSIS — C73 THYROID CANCER: Primary | ICD-10-CM

## 2022-07-20 DIAGNOSIS — R74.8 ABNORMAL LIVER ENZYMES: ICD-10-CM

## 2022-07-20 DIAGNOSIS — E83.51 HYPOCALCEMIA: ICD-10-CM

## 2022-07-20 RX ORDER — LEVOTHYROXINE SODIUM 0.05 MG/1
50 TABLET ORAL DAILY
Qty: 30 TABLET | Refills: 3 | Status: SHIPPED | OUTPATIENT
Start: 2022-07-20 | End: 2022-09-16

## 2022-07-21 LAB
THYROGLOB AB SERPL-ACNC: <1 IU/ML
THYROGLOB SERPL-MCNC: <0.1 NG/ML
THYROGLOB SERPL-MCNC: NORMAL NG/ML

## 2022-07-22 ENCOUNTER — LAB (OUTPATIENT)
Dept: LAB | Facility: HOSPITAL | Age: 37
End: 2022-07-22

## 2022-07-22 DIAGNOSIS — R74.8 ABNORMAL LIVER ENZYMES: ICD-10-CM

## 2022-07-22 DIAGNOSIS — C73 THYROID CANCER: ICD-10-CM

## 2022-07-22 DIAGNOSIS — E83.51 HYPOCALCEMIA: ICD-10-CM

## 2022-07-22 DIAGNOSIS — E89.0 POSTOPERATIVE HYPOTHYROIDISM: ICD-10-CM

## 2022-07-22 LAB
HAV IGM SERPL QL IA: NORMAL
HBV CORE IGM SERPL QL IA: NORMAL
HBV SURFACE AG SERPL QL IA: NORMAL
HCV AB SER DONR QL: NORMAL

## 2022-07-22 PROCEDURE — 36415 COLL VENOUS BLD VENIPUNCTURE: CPT

## 2022-07-22 PROCEDURE — 80074 ACUTE HEPATITIS PANEL: CPT

## 2022-07-28 ENCOUNTER — TREATMENT (OUTPATIENT)
Dept: PHYSICAL THERAPY | Facility: CLINIC | Age: 37
End: 2022-07-28

## 2022-07-28 DIAGNOSIS — G89.29 CHRONIC MIDLINE LOW BACK PAIN WITHOUT SCIATICA: ICD-10-CM

## 2022-07-28 DIAGNOSIS — M54.50 CHRONIC MIDLINE LOW BACK PAIN WITHOUT SCIATICA: ICD-10-CM

## 2022-07-28 DIAGNOSIS — G89.29 CHRONIC MIDLINE THORACIC BACK PAIN: ICD-10-CM

## 2022-07-28 DIAGNOSIS — Z98.1 S/P FUSION OF THORACIC SPINE: Primary | ICD-10-CM

## 2022-07-28 DIAGNOSIS — M54.6 CHRONIC MIDLINE THORACIC BACK PAIN: ICD-10-CM

## 2022-07-28 DIAGNOSIS — Z98.1 HISTORY OF LUMBAR FUSION: ICD-10-CM

## 2022-07-28 DIAGNOSIS — Z74.09 IMPAIRED FUNCTIONAL MOBILITY AND ACTIVITY TOLERANCE: ICD-10-CM

## 2022-07-28 PROCEDURE — 97162 PT EVAL MOD COMPLEX 30 MIN: CPT | Performed by: PHYSICAL THERAPIST

## 2022-07-28 NOTE — PROGRESS NOTES
Physical Therapy Initial Evaluation and Plan of Care    Patient: Iva Cameron   : 1985  Diagnosis/ICD-10 Code:  S/P fusion of thoracic spine [Z98.1]  Referring practitioner: TIESHA Estrada  Date of Initial Visit: 2022  Today's Date: 2022  Patient seen for 1 sessions           Subjective Questionnaire: PT Functional Test: Oswestry 54%      Subjective Evaluation    History of Present Illness  Mechanism of injury: Patient is a 37-year-old female with complaints of chronic midline back and neck pain due to spinal stenosis and degenerative disc disease.  Patient had cervical fusion on 21, lumbar fusion (L5-S1) on 22, and a thoracic fusion (T11-T12) on 22.  She reports the pain has improved some since surgery, but continues to have significant low thoracic/upper lumbar back pain.  Patient states her doctors believe the pain is muscular in nature.  Previous treatment before surgery included injections and physical therapy.  She has not been seen by PT in the past 6 months.  She reports B pain radiating from lower thoracic spine out towards her ribs which worsens with breathing/rib expansion.  She also complains of numbness on the R side from mid shoulder blade to low back.  She reports difficulty with donning socks/shoes, bathing, dressing (has to lean against wall to support herself).  Pain worsens with getting up from a chair, sitting (limited to 30 minutes), prolonged ambulation.  Pain improves with walking (no more than 45 minutes), sitting with a lumbar support, heat, ice.      Patient Occupation: Not currently working   Precautions and Work Restrictions: No bending, lifting, twistingPain  Current pain ratin  At best pain ratin  At worst pain ratin  Location: neck to low back along spine (sometimes radiates into her lower ribs)  Quality: sharp    Social Support  Lives in: one-story house  Lives with: spouse    Treatments  Previous treatment: physical therapy  and injection treatment (steroids, nerve blocks)  Patient Goals  Patient goals for therapy: decreased pain, increased motion and increased strength             Objective        Special Questions      Additional Special Questions  Denies changes in bowel/bladder function, saddle numbness      Postural Observations    Additional Postural Observation Details  Increased lumbar lordosis, increased thoracic kyphosis, B rounded shoulders     Palpation   Left   Muscle spasm in the lumbar paraspinals.   Tenderness of the erector spinae and lumbar paraspinals.     Right   Muscle spasm in the erector spinae and lumbar paraspinals. Tenderness of the erector spinae and lumbar paraspinals.     Tenderness     Lumbar Spine  Tenderness in the spinous process.     Right Hip   Tenderness in the PSIS.     Active Range of Motion   Cervical/Thoracic Spine   Cervical    Flexion: WFL and with pain  Extension: WFL  Left lateral flexion: Neck active lateral bend left: pain contralaterally. WFL and with pain  Right lateral flexion: WFL  Left rotation: WFL  Right rotation: WFL    Thoracic   Flexion: WFL    Additional Active Range of Motion Details  Thoracic extension ~50% limited and painful  Lumbar flexion ~75% limited (hands to mid thigh) and painful  Lumbar extension ~50% limited and painful  Lumbar R lateral flexion ~75% limited and painful  Lumbar L lateral flexion ~50% limited and painful    Strength/Myotome Testing     Left Hip   Planes of Motion   Flexion: 4  Abduction: 4-    Right Hip   Planes of Motion   Flexion: 4  Abduction: 4-    Left Knee   Flexion: 4+  Extension: 5    Right Knee   Flexion: 4+  Extension: 4+    Left Ankle/Foot   Dorsiflexion: 5    Right Ankle/Foot   Dorsiflexion: 5    Ambulation     Comments   Gait: decreased arm swing, decreased trunk rotation, decreased shaunna, decreased B push off    Functional Assessment     Comments  Sit to/from stand: uses BUEs, decreased trunk flexion, decreased anterior weight shift  Bed  mobility: significantly increased time to perform supine to/from sit, uses log roll technique          Assessment & Plan     Assessment  Impairments: abnormal gait, abnormal muscle tone, abnormal or restricted ROM, activity intolerance, impaired physical strength, lacks appropriate home exercise program and pain with function  Functional Limitations: carrying objects, lifting, sleeping, walking, uncomfortable because of pain, moving in bed, sitting, standing and stooping  Assessment details: Patient is a 37 year old female with complaints of neck, thoracic, and low back pain.  Patient presents with limited thoracic and lumbar mobility, decreased core and LE strength, muscle guarding of thoracic and lumbar paraspinals, difficulty and increased time required to perform bed mobility and transitions, impaired gait mechanics, limited standing and ambulation tolerance, and disrupted sleep pattern.  Patient would benefit from skilled aquatic therapy to allow the buoyancy/properties of the water to decrease force of gravity and weight bearing forces through her spine to allow greater participation in therapy for eventual progression to land based therapy for improved functional ability and quality of life.  Patient presents with the impairments listed above and based on the objective findings and the physical therapy evaluation, the patient's condition has the potential to improve in response to therapy.   The patient's condition and/or services required are at a level of complexity that necessitates the skill & supervision of a physical therapist.    Prognosis: good    Goals  Plan Goals: STG:  -Patient will report a reduction in mid to low back pain by >/=25% for improved tolerance to ADLs in 3-4 weeks.  -Patient will demonstrate improved posture with decreased thoracic kyphosis and rounded shoulders without verbal cueing in 2 weeks.  -Patient will be able to tolerate standing for >/=15 minutes with pain to at worst 6/10  for improved ability to perform bathing/dressing in 3 weeks.  -Patient will demonstrate a normalized gait pattern with improved arm swing, shaunna, and trunk rotation in 4 weeks.     LTG:  -Patient will report mid to low back pain to at worst 3-4/10 for improved ability to perform transitions, bed mobility, and community ambulation in 12 weeks.  -Patient will demonstrate trunk mobility to WFL without complaints of increased pain for independence with donning socks/shoes in 12 weeks.  -Patient will demonstrate increased strength of B hips to grossly 4+/5 in 12 weeks.  -Patient will be able to perform 10 sit to stands without use of BUEs in 12 weeks.  -Patient will be able to tolerate sitting for 30 minutes with pain to at worst 4/10 for improved ability to drive to/from appointments in 12 weeks.    Plan  Therapy options: will be seen for skilled therapy services  Planned modality interventions: cryotherapy, dry needling, electrical stimulation/Russian stimulation, TENS, thermotherapy (hydrocollator packs), traction, ultrasound and hydrotherapy  Planned therapy interventions: manual therapy, postural training, soft tissue mobilization, spinal/joint mobilization, strengthening, stretching, therapeutic activities, joint mobilization, home exercise program, functional ROM exercises, flexibility, body mechanics training and abdominal trunk stabilization (aquatic therapy [id number])  Frequency: 2x week  Duration in weeks: 12  Treatment plan discussed with: patient  Plan details: Plan to include aquatic therapy [id number]        History # of Personal Factors and/or Comorbidities: MODERATE (1-2)  Examination of Body System(s): # of elements: MODERATE (3)  Clinical Presentation: EVOLVING  Clinical Decision Making: MODERATE    Timed:         Manual Therapy:         mins  79764;     Therapeutic Exercise:    5     mins  34326;     Neuromuscular Jhonny:        mins  67440;    Therapeutic Activity:          mins  72886;     Gait Training:            mins  34721;     Ultrasound:          mins  64394;    Ionto                                   mins   74849    Un-Timed:  Electrical Stimulation:         mins  91652 ( );  Dry Needling          mins 52646; 17326  Traction          mins 72702  Low Eval          Mins  82201  Mod Eval     35     Mins  09096  High Eval                            Mins  41505      Timed Treatment:   5   mins   Total Treatment:     40   mins    PT SIGNATURE: Patito Atkinson PT   [place] License # [id number]  Physical Therapist  Electronically signed by Patito Atkinson PT, 07/28/22, 1:35 PM EDT      Initial Certification  Certification Period: 7/28/2022 thru 10/25/2022  I certify that the therapy services are furnished while this patient is under my care.  The services outlined above are required by this patient, and will be reviewed every 90 days.     PHYSICIAN: Melly Dumont FNP _____________________________________________________________________________________      DATE: __________________________________________    Please sign and return via fax to [phone]. Thank you, [facility].

## 2022-07-29 ENCOUNTER — TELEPHONE (OUTPATIENT)
Dept: ENDOCRINOLOGY | Facility: CLINIC | Age: 37
End: 2022-07-29

## 2022-07-29 DIAGNOSIS — R74.8 ELEVATED LIVER ENZYMES: Primary | ICD-10-CM

## 2022-07-29 RX ORDER — CALCIUM CARBONATE/VITAMIN D3 500MG-5MCG
1 TABLET ORAL 2 TIMES DAILY
Qty: 180 TABLET | Refills: 3 | Status: SHIPPED | OUTPATIENT
Start: 2022-07-29 | End: 2022-10-18 | Stop reason: SDUPTHER

## 2022-08-03 DIAGNOSIS — R74.8 ELEVATED LIVER ENZYMES: Primary | ICD-10-CM

## 2022-08-04 ENCOUNTER — HOSPITAL ENCOUNTER (OUTPATIENT)
Dept: ULTRASOUND IMAGING | Facility: HOSPITAL | Age: 37
Discharge: HOME | End: 2022-08-04
Admitting: INTERNAL MEDICINE

## 2022-08-04 ENCOUNTER — TREATMENT (OUTPATIENT)
Dept: PHYSICAL THERAPY | Facility: CLINIC | Age: 37
End: 2022-08-04

## 2022-08-04 DIAGNOSIS — M54.50 CHRONIC MIDLINE LOW BACK PAIN WITHOUT SCIATICA: ICD-10-CM

## 2022-08-04 DIAGNOSIS — Z98.1 S/P FUSION OF THORACIC SPINE: Primary | ICD-10-CM

## 2022-08-04 DIAGNOSIS — G89.29 CHRONIC MIDLINE THORACIC BACK PAIN: ICD-10-CM

## 2022-08-04 DIAGNOSIS — R74.8 ELEVATED LIVER ENZYMES: ICD-10-CM

## 2022-08-04 DIAGNOSIS — M54.6 CHRONIC MIDLINE THORACIC BACK PAIN: ICD-10-CM

## 2022-08-04 DIAGNOSIS — Z98.1 HISTORY OF LUMBAR FUSION: ICD-10-CM

## 2022-08-04 DIAGNOSIS — G89.29 CHRONIC MIDLINE LOW BACK PAIN WITHOUT SCIATICA: ICD-10-CM

## 2022-08-04 DIAGNOSIS — Z74.09 IMPAIRED FUNCTIONAL MOBILITY AND ACTIVITY TOLERANCE: ICD-10-CM

## 2022-08-04 PROCEDURE — 76705 ECHO EXAM OF ABDOMEN: CPT

## 2022-08-04 PROCEDURE — 97014 ELECTRIC STIMULATION THERAPY: CPT | Performed by: PHYSICAL THERAPIST

## 2022-08-04 PROCEDURE — 97110 THERAPEUTIC EXERCISES: CPT | Performed by: PHYSICAL THERAPIST

## 2022-08-04 NOTE — PROGRESS NOTES
Physical Therapy Daily Progress Note      Patient: Iva Cameron   : 1985  Diagnosis/ICD-10 Code:  S/P fusion of thoracic spine [Z98.1]   Problems Addressed this Visit        Musculoskeletal and Injuries    Lower back pain      Other Visit Diagnoses     S/P fusion of thoracic spine    -  Primary    History of lumbar fusion        Chronic midline thoracic back pain        Impaired functional mobility and activity tolerance          Diagnoses       Codes Comments    S/P fusion of thoracic spine    -  Primary ICD-10-CM: Z98.1  ICD-9-CM: V45.4     History of lumbar fusion     ICD-10-CM: Z98.1  ICD-9-CM: V45.4     Chronic midline low back pain without sciatica     ICD-10-CM: M54.50, G89.29  ICD-9-CM: 724.2, 338.29     Chronic midline thoracic back pain     ICD-10-CM: M54.6, G89.29  ICD-9-CM: 724.1, 338.29     Impaired functional mobility and activity tolerance     ICD-10-CM: Z74.09  ICD-9-CM: V49.89          Referring practitioner: TIESHA Estrada  Date of Initial Visit: Type: THERAPY  Noted: 2022  Today's Date: 2022    VISIT#: 2    Subjective Patient reports her back is feeling pretty good today with mild to moderate pain.  Patient states her HEP is going well but the cat/cow exercise when she arches her back bothers her if performed every day.      Objective     See Exercise, Manual, and Modality Logs for complete treatment.     Assessment/Plan Initiated exercise this date with patient reporting no increased pain with activities.  Diaphragmatic breathing with holds performed for improved tolerance to rib expansion.  Patient with no mid back or rib pain noted with deep breaths.  Continue to progress strengthening and mobility as tolerated for improved ability to perform ADLs, household chores, and improve sleep pattern.    Progress per Plan of Care and Progress strengthening /stabilization /functional activity         Timed:         Manual Therapy:         mins  69379;     Therapeutic  Exercise:    28     mins  09441;     Neuromuscular Jhonny:        mins  23560;    Therapeutic Activity:          mins  98691;     Gait Training:           mins  83378;     Ultrasound:          mins  87172;    Ionto                                   mins   86434    Un-Timed:  Electrical Stimulation:    15     mins  46110 ( );  Dry Needling          mins self-pay 20560; 20561  Traction          mins 87365      Timed Treatment:   28   mins   Total Treatment:     43   mins    Patito Atkinson PT  [place] License # [id number]  Physical Therapist

## 2022-08-05 ENCOUNTER — TELEPHONE (OUTPATIENT)
Dept: ENDOCRINOLOGY | Facility: CLINIC | Age: 37
End: 2022-08-05

## 2022-08-08 ENCOUNTER — TELEPHONE (OUTPATIENT)
Dept: ENDOCRINOLOGY | Facility: CLINIC | Age: 37
End: 2022-08-08

## 2022-08-08 NOTE — TELEPHONE ENCOUNTER
Patient informed.   1.She is asking what to do now with her liver enzymes being high?     2. She is to see her PCP this month to get referral to Gastro. She is asking if that is still necessary?    3. Also asking what could have caused this?

## 2022-08-11 ENCOUNTER — TELEPHONE (OUTPATIENT)
Dept: PHYSICAL THERAPY | Facility: CLINIC | Age: 37
End: 2022-08-11

## 2022-08-17 ENCOUNTER — TREATMENT (OUTPATIENT)
Dept: PHYSICAL THERAPY | Facility: CLINIC | Age: 37
End: 2022-08-17

## 2022-08-17 DIAGNOSIS — Z98.1 HISTORY OF LUMBAR FUSION: ICD-10-CM

## 2022-08-17 DIAGNOSIS — G89.29 CHRONIC MIDLINE THORACIC BACK PAIN: ICD-10-CM

## 2022-08-17 DIAGNOSIS — M54.50 CHRONIC MIDLINE LOW BACK PAIN WITHOUT SCIATICA: ICD-10-CM

## 2022-08-17 DIAGNOSIS — Z98.1 S/P FUSION OF THORACIC SPINE: Primary | ICD-10-CM

## 2022-08-17 DIAGNOSIS — Z74.09 IMPAIRED FUNCTIONAL MOBILITY AND ACTIVITY TOLERANCE: ICD-10-CM

## 2022-08-17 DIAGNOSIS — G89.29 CHRONIC MIDLINE LOW BACK PAIN WITHOUT SCIATICA: ICD-10-CM

## 2022-08-17 DIAGNOSIS — M54.6 CHRONIC MIDLINE THORACIC BACK PAIN: ICD-10-CM

## 2022-08-17 PROCEDURE — 97110 THERAPEUTIC EXERCISES: CPT | Performed by: PHYSICAL THERAPIST

## 2022-08-17 PROCEDURE — 97014 ELECTRIC STIMULATION THERAPY: CPT | Performed by: PHYSICAL THERAPIST

## 2022-08-17 NOTE — PROGRESS NOTES
Physical Therapy Daily Progress Note      Patient: Iva Cameron   : 1985  Diagnosis/ICD-10 Code:  S/P fusion of thoracic spine [Z98.1]   Problems Addressed this Visit        Musculoskeletal and Injuries    Lower back pain      Other Visit Diagnoses     S/P fusion of thoracic spine    -  Primary    History of lumbar fusion        Chronic midline thoracic back pain        Impaired functional mobility and activity tolerance          Diagnoses       Codes Comments    S/P fusion of thoracic spine    -  Primary ICD-10-CM: Z98.1  ICD-9-CM: V45.4     History of lumbar fusion     ICD-10-CM: Z98.1  ICD-9-CM: V45.4     Chronic midline low back pain without sciatica     ICD-10-CM: M54.50, G89.29  ICD-9-CM: 724.2, 338.29     Chronic midline thoracic back pain     ICD-10-CM: M54.6, G89.29  ICD-9-CM: 724.1, 338.29     Impaired functional mobility and activity tolerance     ICD-10-CM: Z74.09  ICD-9-CM: V49.89          Referring practitioner: TIESHA Estrada  Date of Initial Visit: Type: THERAPY  Noted: 2022  Today's Date: 2022    VISIT#: 3    Subjective Patient reports feeling about the same and really would like to do aquatic therapy.       Objective     See Exercise, Manual, and Modality Logs for complete treatment.     Assessment/Plan Patient tolerated all performed therapeutic exercise well with no reports of increased symptoms. Continue to progress strengthening and mobility as tolerated for improved ability to perform ADLs, household chores and improve sleep patterns.       Progress per Plan of Care and Progress strengthening /stabilization /functional activity         Timed:         Manual Therapy:         mins  20830;     Therapeutic Exercise:    20     mins  87065;     Neuromuscular Jhonny:        mins  87303;    Therapeutic Activity:          mins  50809;     Gait Training:           mins  44625;     Ultrasound:          mins  30884;    Ionto                                   mins    84603  Self Care                    _____  mins   79642  Canalith Repos                   mins  60978    Un-Timed:  Electrical Stimulation:    15     mins  98009 ( );  Dry Needling          mins self-pay   Traction          mins 79761    Timed Treatment:  20    mins   Total Treatment:     35   mins    Oniel Geiger PTA  [place] License [id number]  Physical Therapist Assistant

## 2022-08-27 ENCOUNTER — APPOINTMENT (OUTPATIENT)
Dept: CT IMAGING | Facility: HOSPITAL | Age: 37
End: 2022-08-27

## 2022-08-27 ENCOUNTER — APPOINTMENT (OUTPATIENT)
Dept: GENERAL RADIOLOGY | Facility: HOSPITAL | Age: 37
End: 2022-08-27

## 2022-08-27 ENCOUNTER — HOSPITAL ENCOUNTER (OUTPATIENT)
Facility: HOSPITAL | Age: 37
Setting detail: OBSERVATION
Discharge: HOME | End: 2022-08-28
Attending: EMERGENCY MEDICINE | Admitting: EMERGENCY MEDICINE

## 2022-08-27 DIAGNOSIS — R07.9 CHEST PAIN, UNSPECIFIED TYPE: Primary | ICD-10-CM

## 2022-08-27 DIAGNOSIS — R60.9 EDEMA, UNSPECIFIED TYPE: ICD-10-CM

## 2022-08-27 LAB
ALBUMIN SERPL-MCNC: 3.8 G/DL (ref 3.5–5.2)
ALBUMIN/GLOB SERPL: 1.8 G/DL
ALP SERPL-CCNC: 96 U/L (ref 39–117)
ALT SERPL W P-5'-P-CCNC: 65 U/L (ref 1–33)
ANION GAP SERPL CALCULATED.3IONS-SCNC: 10 MMOL/L (ref 5–15)
APTT PPP: 27.1 SECONDS (ref 61–76.5)
AST SERPL-CCNC: 27 U/L (ref 1–32)
BASOPHILS # BLD AUTO: 0.1 10*3/MM3 (ref 0–0.2)
BASOPHILS NFR BLD AUTO: 0.9 % (ref 0–1.5)
BILIRUB SERPL-MCNC: <0.2 MG/DL (ref 0–1.2)
BILIRUB UR QL STRIP: NEGATIVE
BUN SERPL-MCNC: 15 MG/DL (ref 6–20)
BUN/CREAT SERPL: 17.6 (ref 7–25)
CALCIUM SPEC-SCNC: 8.9 MG/DL (ref 8.6–10.5)
CHLORIDE SERPL-SCNC: 106 MMOL/L (ref 98–107)
CLARITY UR: CLEAR
CO2 SERPL-SCNC: 24 MMOL/L (ref 22–29)
COLOR UR: YELLOW
CREAT SERPL-MCNC: 0.85 MG/DL (ref 0.57–1)
D DIMER PPP FEU-MCNC: 0.83 MG/L (FEU) (ref 0–0.59)
DEPRECATED RDW RBC AUTO: 47.3 FL (ref 37–54)
EGFRCR SERPLBLD CKD-EPI 2021: 89.5 ML/MIN/1.73
EOSINOPHIL # BLD AUTO: 0.2 10*3/MM3 (ref 0–0.4)
EOSINOPHIL NFR BLD AUTO: 2.5 % (ref 0.3–6.2)
ERYTHROCYTE [DISTWIDTH] IN BLOOD BY AUTOMATED COUNT: 14.6 % (ref 12.3–15.4)
GLOBULIN UR ELPH-MCNC: 2.1 GM/DL
GLUCOSE SERPL-MCNC: 91 MG/DL (ref 65–99)
GLUCOSE UR STRIP-MCNC: NEGATIVE MG/DL
HCT VFR BLD AUTO: 37.5 % (ref 34–46.6)
HGB BLD-MCNC: 12.7 G/DL (ref 12–15.9)
HGB UR QL STRIP.AUTO: NEGATIVE
HOLD SPECIMEN: NORMAL
INR PPP: 0.98 (ref 0.93–1.1)
KETONES UR QL STRIP: ABNORMAL
LEUKOCYTE ESTERASE UR QL STRIP.AUTO: NEGATIVE
LYMPHOCYTES # BLD AUTO: 2.7 10*3/MM3 (ref 0.7–3.1)
LYMPHOCYTES NFR BLD AUTO: 34.9 % (ref 19.6–45.3)
MAGNESIUM SERPL-MCNC: 1.8 MG/DL (ref 1.6–2.6)
MCH RBC QN AUTO: 31.2 PG (ref 26.6–33)
MCHC RBC AUTO-ENTMCNC: 33.8 G/DL (ref 31.5–35.7)
MCV RBC AUTO: 92.3 FL (ref 79–97)
MONOCYTES # BLD AUTO: 0.5 10*3/MM3 (ref 0.1–0.9)
MONOCYTES NFR BLD AUTO: 6 % (ref 5–12)
NEUTROPHILS NFR BLD AUTO: 4.3 10*3/MM3 (ref 1.7–7)
NEUTROPHILS NFR BLD AUTO: 55.7 % (ref 42.7–76)
NITRITE UR QL STRIP: NEGATIVE
NRBC BLD AUTO-RTO: 0.1 /100 WBC (ref 0–0.2)
NT-PROBNP SERPL-MCNC: 132.6 PG/ML (ref 0–450)
PH UR STRIP.AUTO: 6.5 [PH] (ref 5–8)
PLATELET # BLD AUTO: 270 10*3/MM3 (ref 140–450)
PMV BLD AUTO: 8 FL (ref 6–12)
POTASSIUM SERPL-SCNC: 3.7 MMOL/L (ref 3.5–5.2)
PROT SERPL-MCNC: 5.9 G/DL (ref 6–8.5)
PROT UR QL STRIP: NEGATIVE
PROTHROMBIN TIME: 10.1 SECONDS (ref 9.6–11.7)
RBC # BLD AUTO: 4.06 10*6/MM3 (ref 3.77–5.28)
SODIUM SERPL-SCNC: 140 MMOL/L (ref 136–145)
SP GR UR STRIP: 1.03 (ref 1–1.03)
TROPONIN T SERPL-MCNC: <0.01 NG/ML (ref 0–0.03)
UROBILINOGEN UR QL STRIP: ABNORMAL
WBC NRBC COR # BLD: 7.7 10*3/MM3 (ref 3.4–10.8)

## 2022-08-27 PROCEDURE — G0378 HOSPITAL OBSERVATION PER HR: HCPCS

## 2022-08-27 PROCEDURE — 96374 THER/PROPH/DIAG INJ IV PUSH: CPT

## 2022-08-27 PROCEDURE — 85610 PROTHROMBIN TIME: CPT | Performed by: EMERGENCY MEDICINE

## 2022-08-27 PROCEDURE — 71045 X-RAY EXAM CHEST 1 VIEW: CPT

## 2022-08-27 PROCEDURE — 81003 URINALYSIS AUTO W/O SCOPE: CPT | Performed by: EMERGENCY MEDICINE

## 2022-08-27 PROCEDURE — 84484 ASSAY OF TROPONIN QUANT: CPT | Performed by: EMERGENCY MEDICINE

## 2022-08-27 PROCEDURE — 93005 ELECTROCARDIOGRAM TRACING: CPT | Performed by: EMERGENCY MEDICINE

## 2022-08-27 PROCEDURE — 0 IOPAMIDOL PER 1 ML: Performed by: EMERGENCY MEDICINE

## 2022-08-27 PROCEDURE — 83735 ASSAY OF MAGNESIUM: CPT | Performed by: EMERGENCY MEDICINE

## 2022-08-27 PROCEDURE — 85730 THROMBOPLASTIN TIME PARTIAL: CPT | Performed by: EMERGENCY MEDICINE

## 2022-08-27 PROCEDURE — U0003 INFECTIOUS AGENT DETECTION BY NUCLEIC ACID (DNA OR RNA); SEVERE ACUTE RESPIRATORY SYNDROME CORONAVIRUS 2 (SARS-COV-2) (CORONAVIRUS DISEASE [COVID-19]), AMPLIFIED PROBE TECHNIQUE, MAKING USE OF HIGH THROUGHPUT TECHNOLOGIES AS DESCRIBED BY CMS-2020-01-R: HCPCS | Performed by: EMERGENCY MEDICINE

## 2022-08-27 PROCEDURE — 83880 ASSAY OF NATRIURETIC PEPTIDE: CPT | Performed by: EMERGENCY MEDICINE

## 2022-08-27 PROCEDURE — 25010000002 ONDANSETRON PER 1 MG: Performed by: EMERGENCY MEDICINE

## 2022-08-27 PROCEDURE — 85379 FIBRIN DEGRADATION QUANT: CPT | Performed by: EMERGENCY MEDICINE

## 2022-08-27 PROCEDURE — C9803 HOPD COVID-19 SPEC COLLECT: HCPCS

## 2022-08-27 PROCEDURE — 71275 CT ANGIOGRAPHY CHEST: CPT

## 2022-08-27 PROCEDURE — 99284 EMERGENCY DEPT VISIT MOD MDM: CPT

## 2022-08-27 PROCEDURE — 80053 COMPREHEN METABOLIC PANEL: CPT | Performed by: EMERGENCY MEDICINE

## 2022-08-27 PROCEDURE — 85025 COMPLETE CBC W/AUTO DIFF WBC: CPT | Performed by: EMERGENCY MEDICINE

## 2022-08-27 RX ORDER — ONDANSETRON 2 MG/ML
4 INJECTION INTRAMUSCULAR; INTRAVENOUS ONCE
Status: COMPLETED | OUTPATIENT
Start: 2022-08-27 | End: 2022-08-27

## 2022-08-27 RX ORDER — ONDANSETRON 2 MG/ML
4 INJECTION INTRAMUSCULAR; INTRAVENOUS EVERY 6 HOURS PRN
Status: DISCONTINUED | OUTPATIENT
Start: 2022-08-27 | End: 2022-08-28 | Stop reason: HOSPADM

## 2022-08-27 RX ORDER — SODIUM CHLORIDE 0.9 % (FLUSH) 0.9 %
10 SYRINGE (ML) INJECTION AS NEEDED
Status: DISCONTINUED | OUTPATIENT
Start: 2022-08-27 | End: 2022-08-28 | Stop reason: HOSPADM

## 2022-08-27 RX ORDER — CHOLECALCIFEROL (VITAMIN D3) 125 MCG
5 CAPSULE ORAL NIGHTLY PRN
Status: DISCONTINUED | OUTPATIENT
Start: 2022-08-27 | End: 2022-08-28 | Stop reason: HOSPADM

## 2022-08-27 RX ORDER — AMITRIPTYLINE HYDROCHLORIDE 10 MG/1
10 TABLET, FILM COATED ORAL NIGHTLY
Status: DISCONTINUED | OUTPATIENT
Start: 2022-08-27 | End: 2022-08-28 | Stop reason: HOSPADM

## 2022-08-27 RX ORDER — HYDROCODONE BITARTRATE AND ACETAMINOPHEN 7.5; 325 MG/1; MG/1
1 TABLET ORAL 3 TIMES DAILY PRN
Status: DISCONTINUED | OUTPATIENT
Start: 2022-08-27 | End: 2022-08-28 | Stop reason: HOSPADM

## 2022-08-27 RX ORDER — ACETAMINOPHEN 325 MG/1
650 TABLET ORAL EVERY 4 HOURS PRN
Status: DISCONTINUED | OUTPATIENT
Start: 2022-08-27 | End: 2022-08-28 | Stop reason: HOSPADM

## 2022-08-27 RX ORDER — ASPIRIN 81 MG/1
324 TABLET, CHEWABLE ORAL ONCE
Status: COMPLETED | OUTPATIENT
Start: 2022-08-27 | End: 2022-08-27

## 2022-08-27 RX ADMIN — IOPAMIDOL 100 ML: 755 INJECTION, SOLUTION INTRAVENOUS at 19:54

## 2022-08-27 RX ADMIN — HYDROCODONE BITARTRATE AND ACETAMINOPHEN 1 TABLET: 7.5; 325 TABLET ORAL at 22:29

## 2022-08-27 RX ADMIN — AMITRIPTYLINE HYDROCHLORIDE 10 MG: 10 TABLET, FILM COATED ORAL at 22:28

## 2022-08-27 RX ADMIN — ASPIRIN 81 MG CHEWABLE TABLET 324 MG: 81 TABLET CHEWABLE at 21:07

## 2022-08-27 RX ADMIN — ONDANSETRON 4 MG: 2 INJECTION INTRAMUSCULAR; INTRAVENOUS at 18:44

## 2022-08-28 ENCOUNTER — APPOINTMENT (OUTPATIENT)
Dept: NUCLEAR MEDICINE | Facility: HOSPITAL | Age: 37
End: 2022-08-28

## 2022-08-28 ENCOUNTER — APPOINTMENT (OUTPATIENT)
Dept: CARDIOLOGY | Facility: HOSPITAL | Age: 37
End: 2022-08-28

## 2022-08-28 VITALS
HEART RATE: 77 BPM | BODY MASS INDEX: 39.01 KG/M2 | DIASTOLIC BLOOD PRESSURE: 74 MMHG | WEIGHT: 212 LBS | OXYGEN SATURATION: 98 % | SYSTOLIC BLOOD PRESSURE: 110 MMHG | TEMPERATURE: 98.6 F | RESPIRATION RATE: 16 BRPM | HEIGHT: 62 IN

## 2022-08-28 LAB
ANION GAP SERPL CALCULATED.3IONS-SCNC: 10 MMOL/L (ref 5–15)
BASOPHILS # BLD AUTO: 0.1 10*3/MM3 (ref 0–0.2)
BASOPHILS NFR BLD AUTO: 0.9 % (ref 0–1.5)
BH CV ECHO MEAS - ACS: 1.97 CM
BH CV ECHO MEAS - AO MAX PG: 4.1 MMHG
BH CV ECHO MEAS - AO MEAN PG: 2.33 MMHG
BH CV ECHO MEAS - AO ROOT DIAM: 3.7 CM
BH CV ECHO MEAS - AO V2 MAX: 101 CM/SEC
BH CV ECHO MEAS - AO V2 VTI: 24.6 CM
BH CV ECHO MEAS - AVA(I,D): 4 CM2
BH CV ECHO MEAS - EDV(CUBED): 84.1 ML
BH CV ECHO MEAS - EDV(MOD-SP4): 75.8 ML
BH CV ECHO MEAS - EF(MOD-BP): 60 %
BH CV ECHO MEAS - EF(MOD-SP4): 59.5 %
BH CV ECHO MEAS - ESV(CUBED): 20.6 ML
BH CV ECHO MEAS - ESV(MOD-SP4): 30.7 ML
BH CV ECHO MEAS - FS: 37.4 %
BH CV ECHO MEAS - IVS/LVPW: 0.87 CM
BH CV ECHO MEAS - IVSD: 0.81 CM
BH CV ECHO MEAS - LA A2CS (ATRIAL LENGTH): 3.7 CM
BH CV ECHO MEAS - LV DIASTOLIC VOL/BSA (35-75): 38.7 CM2
BH CV ECHO MEAS - LV MASS(C)D: 120.6 GRAMS
BH CV ECHO MEAS - LV MAX PG: 4.9 MMHG
BH CV ECHO MEAS - LV MEAN PG: 2.6 MMHG
BH CV ECHO MEAS - LV SYSTOLIC VOL/BSA (12-30): 15.7 CM2
BH CV ECHO MEAS - LV V1 MAX: 110.1 CM/SEC
BH CV ECHO MEAS - LV V1 VTI: 24.4 CM
BH CV ECHO MEAS - LVIDD: 4.4 CM
BH CV ECHO MEAS - LVIDS: 2.7 CM
BH CV ECHO MEAS - LVOT AREA: 4 CM2
BH CV ECHO MEAS - LVOT DIAM: 2.26 CM
BH CV ECHO MEAS - LVPWD: 0.93 CM
BH CV ECHO MEAS - MV A MAX VEL: 61.9 CM/SEC
BH CV ECHO MEAS - MV DEC SLOPE: 533.4 CM/SEC2
BH CV ECHO MEAS - MV DEC TIME: 0.17 MSEC
BH CV ECHO MEAS - MV E MAX VEL: 89.3 CM/SEC
BH CV ECHO MEAS - MV E/A: 1.44
BH CV ECHO MEAS - MV MAX PG: 2.8 MMHG
BH CV ECHO MEAS - MV MEAN PG: 1.22 MMHG
BH CV ECHO MEAS - MV V2 VTI: 20.4 CM
BH CV ECHO MEAS - MVA(VTI): 4.8 CM2
BH CV ECHO MEAS - PA V2 MAX: 77 CM/SEC
BH CV ECHO MEAS - PULM A REVS DUR: 0.08 SEC
BH CV ECHO MEAS - PULM A REVS VEL: 20.4 CM/SEC
BH CV ECHO MEAS - PULM DIAS VEL: 40.9 CM/SEC
BH CV ECHO MEAS - PULM S/D: 1.42
BH CV ECHO MEAS - PULM SYS VEL: 58.2 CM/SEC
BH CV ECHO MEAS - RAP SYSTOLE: 3 MMHG
BH CV ECHO MEAS - RV MAX PG: 1.32 MMHG
BH CV ECHO MEAS - RV V1 MAX: 57.4 CM/SEC
BH CV ECHO MEAS - RV V1 VTI: 15.6 CM
BH CV ECHO MEAS - RVDD: 2.5 CM
BH CV ECHO MEAS - RVSP: 16.8 MMHG
BH CV ECHO MEAS - SI(MOD-SP4): 23 ML/M2
BH CV ECHO MEAS - SV(LVOT): 98.4 ML
BH CV ECHO MEAS - SV(MOD-SP4): 45.1 ML
BH CV ECHO MEAS - TR MAX PG: 13.8 MMHG
BH CV ECHO MEAS - TR MAX VEL: 185.7 CM/SEC
BH CV LOWER VASCULAR LEFT COMMON FEMORAL AUGMENT: NORMAL
BH CV LOWER VASCULAR LEFT COMMON FEMORAL COMPETENT: NORMAL
BH CV LOWER VASCULAR LEFT COMMON FEMORAL COMPRESS: NORMAL
BH CV LOWER VASCULAR LEFT COMMON FEMORAL PHASIC: NORMAL
BH CV LOWER VASCULAR LEFT COMMON FEMORAL SPONT: NORMAL
BH CV LOWER VASCULAR LEFT DISTAL FEMORAL COMPRESS: NORMAL
BH CV LOWER VASCULAR LEFT GASTRONEMIUS COMPRESS: NORMAL
BH CV LOWER VASCULAR LEFT GREATER SAPH AK COMPRESS: NORMAL
BH CV LOWER VASCULAR LEFT GREATER SAPH BK COMPRESS: NORMAL
BH CV LOWER VASCULAR LEFT LESSER SAPH COMPRESS: NORMAL
BH CV LOWER VASCULAR LEFT MID FEMORAL AUGMENT: NORMAL
BH CV LOWER VASCULAR LEFT MID FEMORAL COMPETENT: NORMAL
BH CV LOWER VASCULAR LEFT MID FEMORAL COMPRESS: NORMAL
BH CV LOWER VASCULAR LEFT MID FEMORAL PHASIC: NORMAL
BH CV LOWER VASCULAR LEFT MID FEMORAL SPONT: NORMAL
BH CV LOWER VASCULAR LEFT PERONEAL COMPRESS: NORMAL
BH CV LOWER VASCULAR LEFT POPLITEAL AUGMENT: NORMAL
BH CV LOWER VASCULAR LEFT POPLITEAL COMPETENT: NORMAL
BH CV LOWER VASCULAR LEFT POPLITEAL COMPRESS: NORMAL
BH CV LOWER VASCULAR LEFT POPLITEAL PHASIC: NORMAL
BH CV LOWER VASCULAR LEFT POPLITEAL SPONT: NORMAL
BH CV LOWER VASCULAR LEFT POSTERIOR TIBIAL COMPRESS: NORMAL
BH CV LOWER VASCULAR LEFT PROXIMAL FEMORAL COMPRESS: NORMAL
BH CV LOWER VASCULAR LEFT SAPHENOFEMORAL JUNCTION COMPRESS: NORMAL
BH CV LOWER VASCULAR RIGHT COMMON FEMORAL AUGMENT: NORMAL
BH CV LOWER VASCULAR RIGHT COMMON FEMORAL COMPETENT: NORMAL
BH CV LOWER VASCULAR RIGHT COMMON FEMORAL COMPRESS: NORMAL
BH CV LOWER VASCULAR RIGHT COMMON FEMORAL PHASIC: NORMAL
BH CV LOWER VASCULAR RIGHT COMMON FEMORAL SPONT: NORMAL
BH CV REST NUCLEAR ISOTOPE DOSE: 11 MCI
BH CV STRESS BP STAGE 1: NORMAL
BH CV STRESS BP STAGE 2: NORMAL
BH CV STRESS DURATION MIN STAGE 1: 3
BH CV STRESS DURATION MIN STAGE 2: 1
BH CV STRESS DURATION SEC STAGE 1: 0
BH CV STRESS DURATION SEC STAGE 2: 44
BH CV STRESS GRADE STAGE 1: 10
BH CV STRESS GRADE STAGE 2: 12
BH CV STRESS HR STAGE 1: 108
BH CV STRESS HR STAGE 2: 127
BH CV STRESS METS STAGE 1: 5
BH CV STRESS METS STAGE 2: 7.5
BH CV STRESS NUCLEAR ISOTOPE DOSE: 33 MCI
BH CV STRESS PROTOCOL 1: NORMAL
BH CV STRESS PROTOCOL 2 BP STAGE 1: NORMAL
BH CV STRESS PROTOCOL 2 COMMENTS STAGE 1: NORMAL
BH CV STRESS PROTOCOL 2 DOSE REGADENOSON STAGE 1: 0.4
BH CV STRESS PROTOCOL 2 DURATION MIN STAGE 1: 0
BH CV STRESS PROTOCOL 2 DURATION SEC STAGE 1: 10
BH CV STRESS PROTOCOL 2 HR STAGE 1: 106
BH CV STRESS PROTOCOL 2 STAGE 1: 1
BH CV STRESS PROTOCOL 2: NORMAL
BH CV STRESS RECOVERY BP: NORMAL MMHG
BH CV STRESS RECOVERY HR: 93 BPM
BH CV STRESS SPEED STAGE 1: 1.7
BH CV STRESS SPEED STAGE 2: 2.5
BH CV STRESS STAGE 1: 1
BH CV STRESS STAGE 2: 2
BH CV UPPER VENOUS LEFT AXILLARY AUGMENT: NORMAL
BH CV UPPER VENOUS LEFT AXILLARY COMPRESS: NORMAL
BH CV UPPER VENOUS LEFT AXILLARY PHASIC: NORMAL
BH CV UPPER VENOUS LEFT AXILLARY SPONT: NORMAL
BH CV UPPER VENOUS LEFT BASILIC FOREARM COMPRESS: NORMAL
BH CV UPPER VENOUS LEFT BASILIC UPPER COMPRESS: NORMAL
BH CV UPPER VENOUS LEFT BRACHIAL COMPRESS: NORMAL
BH CV UPPER VENOUS LEFT CEPHALIC FOREARM COMPRESS: NORMAL
BH CV UPPER VENOUS LEFT CEPHALIC UPPER COMPRESS: NORMAL
BH CV UPPER VENOUS LEFT INTERNAL JUGULAR AUGMENT: NORMAL
BH CV UPPER VENOUS LEFT INTERNAL JUGULAR COMPRESS: NORMAL
BH CV UPPER VENOUS LEFT INTERNAL JUGULAR PHASIC: NORMAL
BH CV UPPER VENOUS LEFT INTERNAL JUGULAR SPONT: NORMAL
BH CV UPPER VENOUS LEFT RADIAL COMPRESS: NORMAL
BH CV UPPER VENOUS LEFT SUBCLAVIAN AUGMENT: NORMAL
BH CV UPPER VENOUS LEFT SUBCLAVIAN COMPRESS: NORMAL
BH CV UPPER VENOUS LEFT SUBCLAVIAN PHASIC: NORMAL
BH CV UPPER VENOUS LEFT SUBCLAVIAN SPONT: NORMAL
BH CV UPPER VENOUS LEFT ULNAR COMPRESS: NORMAL
BH CV UPPER VENOUS RIGHT INTERNAL JUGULAR AUGMENT: NORMAL
BH CV UPPER VENOUS RIGHT INTERNAL JUGULAR COMPRESS: NORMAL
BH CV UPPER VENOUS RIGHT INTERNAL JUGULAR PHASIC: NORMAL
BH CV UPPER VENOUS RIGHT INTERNAL JUGULAR SPONT: NORMAL
BH CV UPPER VENOUS RIGHT SUBCLAVIAN AUGMENT: NORMAL
BH CV UPPER VENOUS RIGHT SUBCLAVIAN COMPRESS: NORMAL
BH CV UPPER VENOUS RIGHT SUBCLAVIAN PHASIC: NORMAL
BH CV UPPER VENOUS RIGHT SUBCLAVIAN SPONT: NORMAL
BUN SERPL-MCNC: 14 MG/DL (ref 6–20)
BUN/CREAT SERPL: 16.5 (ref 7–25)
CALCIUM SPEC-SCNC: 8.9 MG/DL (ref 8.6–10.5)
CHLORIDE SERPL-SCNC: 105 MMOL/L (ref 98–107)
CO2 SERPL-SCNC: 27 MMOL/L (ref 22–29)
CREAT SERPL-MCNC: 0.85 MG/DL (ref 0.57–1)
DEPRECATED RDW RBC AUTO: 47.7 FL (ref 37–54)
EGFRCR SERPLBLD CKD-EPI 2021: 89.5 ML/MIN/1.73
EOSINOPHIL # BLD AUTO: 0.3 10*3/MM3 (ref 0–0.4)
EOSINOPHIL NFR BLD AUTO: 3.4 % (ref 0.3–6.2)
ERYTHROCYTE [DISTWIDTH] IN BLOOD BY AUTOMATED COUNT: 14.8 % (ref 12.3–15.4)
GLUCOSE SERPL-MCNC: 115 MG/DL (ref 65–99)
HCT VFR BLD AUTO: 40.2 % (ref 34–46.6)
HGB BLD-MCNC: 13.1 G/DL (ref 12–15.9)
LV EF NUC BP: 71 %
LYMPHOCYTES # BLD AUTO: 3.1 10*3/MM3 (ref 0.7–3.1)
LYMPHOCYTES NFR BLD AUTO: 35.5 % (ref 19.6–45.3)
MAXIMAL PREDICTED HEART RATE: 181 BPM
MCH RBC QN AUTO: 30.5 PG (ref 26.6–33)
MCHC RBC AUTO-ENTMCNC: 32.6 G/DL (ref 31.5–35.7)
MCV RBC AUTO: 93.3 FL (ref 79–97)
MONOCYTES # BLD AUTO: 0.6 10*3/MM3 (ref 0.1–0.9)
MONOCYTES NFR BLD AUTO: 6.6 % (ref 5–12)
NEUTROPHILS NFR BLD AUTO: 4.6 10*3/MM3 (ref 1.7–7)
NEUTROPHILS NFR BLD AUTO: 53.6 % (ref 42.7–76)
NRBC BLD AUTO-RTO: 0 /100 WBC (ref 0–0.2)
PERCENT MAX PREDICTED HR: 70.17 %
PLATELET # BLD AUTO: 269 10*3/MM3 (ref 140–450)
PMV BLD AUTO: 8.5 FL (ref 6–12)
POTASSIUM SERPL-SCNC: 3.8 MMOL/L (ref 3.5–5.2)
QT INTERVAL: 344 MS
RBC # BLD AUTO: 4.31 10*6/MM3 (ref 3.77–5.28)
SARS-COV-2 RNA RESP QL NAA+PROBE: NOT DETECTED
SODIUM SERPL-SCNC: 142 MMOL/L (ref 136–145)
STRESS BASELINE BP: NORMAL MMHG
STRESS BASELINE HR: 69 BPM
STRESS PERCENT HR: 83 %
STRESS POST PEAK BP: NORMAL MMHG
STRESS POST PEAK HR: 127 BPM
STRESS TARGET HR: 154 BPM
TROPONIN T SERPL-MCNC: <0.01 NG/ML (ref 0–0.03)
WBC NRBC COR # BLD: 8.7 10*3/MM3 (ref 3.4–10.8)

## 2022-08-28 PROCEDURE — 93017 CV STRESS TEST TRACING ONLY: CPT

## 2022-08-28 PROCEDURE — 93306 TTE W/DOPPLER COMPLETE: CPT

## 2022-08-28 PROCEDURE — 78452 HT MUSCLE IMAGE SPECT MULT: CPT | Performed by: INTERNAL MEDICINE

## 2022-08-28 PROCEDURE — 0 TECHNETIUM TETROFOSMIN KIT: Performed by: EMERGENCY MEDICINE

## 2022-08-28 PROCEDURE — 99204 OFFICE O/P NEW MOD 45 MIN: CPT | Performed by: INTERNAL MEDICINE

## 2022-08-28 PROCEDURE — G0378 HOSPITAL OBSERVATION PER HR: HCPCS

## 2022-08-28 PROCEDURE — 80048 BASIC METABOLIC PNL TOTAL CA: CPT | Performed by: EMERGENCY MEDICINE

## 2022-08-28 PROCEDURE — 93971 EXTREMITY STUDY: CPT

## 2022-08-28 PROCEDURE — A9502 TC99M TETROFOSMIN: HCPCS | Performed by: EMERGENCY MEDICINE

## 2022-08-28 PROCEDURE — 78452 HT MUSCLE IMAGE SPECT MULT: CPT

## 2022-08-28 PROCEDURE — 93016 CV STRESS TEST SUPVJ ONLY: CPT | Performed by: NURSE PRACTITIONER

## 2022-08-28 PROCEDURE — 93306 TTE W/DOPPLER COMPLETE: CPT | Performed by: INTERNAL MEDICINE

## 2022-08-28 PROCEDURE — 84484 ASSAY OF TROPONIN QUANT: CPT | Performed by: EMERGENCY MEDICINE

## 2022-08-28 PROCEDURE — 85025 COMPLETE CBC W/AUTO DIFF WBC: CPT | Performed by: EMERGENCY MEDICINE

## 2022-08-28 PROCEDURE — 93018 CV STRESS TEST I&R ONLY: CPT | Performed by: INTERNAL MEDICINE

## 2022-08-28 RX ORDER — PANTOPRAZOLE SODIUM 40 MG/1
40 TABLET, DELAYED RELEASE ORAL DAILY
Qty: 30 TABLET | Refills: 0 | Status: SHIPPED | OUTPATIENT
Start: 2022-08-28

## 2022-08-28 RX ORDER — ALBUTEROL SULFATE 2.5 MG/3ML
2.5 SOLUTION RESPIRATORY (INHALATION) EVERY 6 HOURS PRN
Status: DISCONTINUED | OUTPATIENT
Start: 2022-08-28 | End: 2022-08-28 | Stop reason: HOSPADM

## 2022-08-28 RX ORDER — LEVOTHYROXINE SODIUM 0.2 MG/1
200 TABLET ORAL DAILY
Status: DISCONTINUED | OUTPATIENT
Start: 2022-08-28 | End: 2022-08-28

## 2022-08-28 RX ORDER — LEVOTHYROXINE SODIUM 0.05 MG/1
50 TABLET ORAL DAILY
Status: DISCONTINUED | OUTPATIENT
Start: 2022-08-28 | End: 2022-08-28

## 2022-08-28 RX ORDER — ONDANSETRON 4 MG/1
4 TABLET, ORALLY DISINTEGRATING ORAL EVERY 8 HOURS PRN
Qty: 12 TABLET | Refills: 0 | Status: SHIPPED | OUTPATIENT
Start: 2022-08-28

## 2022-08-28 RX ORDER — HYDROCODONE BITARTRATE AND ACETAMINOPHEN 7.5; 325 MG/1; MG/1
1 TABLET ORAL 3 TIMES DAILY PRN
Status: DISCONTINUED | OUTPATIENT
Start: 2022-08-28 | End: 2022-08-28 | Stop reason: SDUPTHER

## 2022-08-28 RX ORDER — LEVOTHYROXINE SODIUM 0.12 MG/1
250 TABLET ORAL
Status: DISCONTINUED | OUTPATIENT
Start: 2022-08-28 | End: 2022-08-28 | Stop reason: HOSPADM

## 2022-08-28 RX ADMIN — TETROFOSMIN 1 DOSE: 1.38 INJECTION, POWDER, LYOPHILIZED, FOR SOLUTION INTRAVENOUS at 07:37

## 2022-08-28 RX ADMIN — HYDROCODONE BITARTRATE AND ACETAMINOPHEN 1 TABLET: 7.5; 325 TABLET ORAL at 16:45

## 2022-08-28 RX ADMIN — Medication 1 TABLET: at 10:37

## 2022-08-28 RX ADMIN — LEVOTHYROXINE SODIUM 250 MCG: 0.12 TABLET ORAL at 07:54

## 2022-08-28 RX ADMIN — HYDROCODONE BITARTRATE AND ACETAMINOPHEN 1 TABLET: 7.5; 325 TABLET ORAL at 08:01

## 2022-09-16 DIAGNOSIS — E89.0 POSTOPERATIVE HYPOTHYROIDISM: Primary | ICD-10-CM

## 2022-09-16 RX ORDER — LEVOTHYROXINE SODIUM 112 UG/1
224 TABLET ORAL DAILY
Qty: 60 TABLET | Refills: 1 | Status: SHIPPED | OUTPATIENT
Start: 2022-09-16 | End: 2022-10-12

## 2022-10-12 RX ORDER — LEVOTHYROXINE SODIUM 112 UG/1
224 TABLET ORAL DAILY
Qty: 60 TABLET | Refills: 3 | Status: SHIPPED | OUTPATIENT
Start: 2022-10-12 | End: 2023-01-10

## 2022-10-17 RX ORDER — LEVOTHYROXINE SODIUM 0.05 MG/1
TABLET ORAL
Qty: 90 TABLET | Refills: 1 | OUTPATIENT
Start: 2022-10-17

## 2022-10-18 RX ORDER — CALCIUM CARBONATE/VITAMIN D3 500MG-5MCG
1 TABLET ORAL 2 TIMES DAILY
Qty: 180 TABLET | Refills: 1 | Status: SHIPPED | OUTPATIENT
Start: 2022-10-18 | End: 2023-02-17

## 2022-11-18 ENCOUNTER — TRANSCRIBE ORDERS (OUTPATIENT)
Dept: ADMINISTRATIVE | Facility: HOSPITAL | Age: 37
End: 2022-11-18

## 2022-11-18 DIAGNOSIS — K21.9 GERD WITHOUT ESOPHAGITIS: Primary | ICD-10-CM

## 2022-12-06 ENCOUNTER — DOCUMENTATION (OUTPATIENT)
Dept: PHYSICAL THERAPY | Facility: CLINIC | Age: 37
End: 2022-12-06

## 2022-12-06 NOTE — PROGRESS NOTES
Discharge Summary  Discharge Summary from Physical Therapy Report      Dates  PT visit: 7/28/22 - 8/17/22  Number of Visits: 3     Discharge Status of Patient: See MD Note dated 8/17/22    Goals: Not Met    Discharge Plan: Future need for rehabilitation activities    Comments Patient with poor tolerance to land based therapy.  Recommending seeking treatment at [facility] to trial aquatic therapy.  D/C from PT at this time.    Date of Discharge 12/6/22        Patito Atkinson, PT  Physical Therapist

## 2023-01-10 RX ORDER — LEVOTHYROXINE SODIUM 112 UG/1
224 TABLET ORAL DAILY
Qty: 180 TABLET | Refills: 1 | Status: SHIPPED | OUTPATIENT
Start: 2023-01-10 | End: 2023-02-20 | Stop reason: SDUPTHER

## 2023-02-15 PROBLEM — M50.20 CERVICAL DISC HERNIATION: Status: ACTIVE | Noted: 2022-01-12

## 2023-02-15 RX ORDER — PREDNISONE 10 MG/1
TABLET ORAL
COMMUNITY
Start: 2022-11-16 | End: 2023-02-20

## 2023-02-15 RX ORDER — POLYETHYLENE GLYCOL 3350 17 G/17G
POWDER, FOR SOLUTION ORAL
COMMUNITY
Start: 2022-11-28 | End: 2023-02-20

## 2023-02-15 RX ORDER — FLUTICASONE PROPIONATE 50 MCG
SPRAY, SUSPENSION (ML) NASAL
COMMUNITY
Start: 2022-12-13

## 2023-02-15 RX ORDER — DIAZEPAM 5 MG/1
5 TABLET ORAL
COMMUNITY
Start: 2022-07-12 | End: 2023-02-20

## 2023-02-15 RX ORDER — DOXYCYCLINE HYCLATE 100 MG
1 TABLET ORAL EVERY 12 HOURS SCHEDULED
COMMUNITY
Start: 2022-11-10 | End: 2023-02-20

## 2023-02-15 RX ORDER — ONDANSETRON 4 MG/1
1 TABLET, FILM COATED ORAL EVERY 6 HOURS
COMMUNITY
Start: 2022-12-12

## 2023-02-15 RX ORDER — BACLOFEN 10 MG/1
TABLET ORAL
COMMUNITY
Start: 2023-02-13

## 2023-02-15 RX ORDER — CEFDINIR 300 MG/1
1 CAPSULE ORAL EVERY 12 HOURS SCHEDULED
COMMUNITY
Start: 2023-02-08 | End: 2023-02-20

## 2023-02-15 RX ORDER — ERYTHROMYCIN 250 MG/1
TABLET, COATED ORAL
COMMUNITY
Start: 2022-12-06 | End: 2023-02-20

## 2023-02-15 RX ORDER — AZITHROMYCIN 250 MG/1
TABLET, FILM COATED ORAL SEE ADMIN INSTRUCTIONS
COMMUNITY
Start: 2022-11-16 | End: 2023-02-20

## 2023-02-17 RX ORDER — LEVOTHYROXINE SODIUM 0.05 MG/1
TABLET ORAL
Qty: 90 TABLET | Refills: 1 | OUTPATIENT
Start: 2023-02-17

## 2023-02-17 RX ORDER — CALCIUM CARBONATE/VITAMIN D3 500MG-5MCG
TABLET ORAL
Qty: 180 TABLET | Refills: 1 | Status: SHIPPED | OUTPATIENT
Start: 2023-02-17

## 2023-02-17 RX ORDER — CHOLECALCIFEROL (VITAMIN D3) 125 MCG
CAPSULE ORAL
Qty: 90 TABLET | Refills: 3 | Status: SHIPPED | OUTPATIENT
Start: 2023-02-17

## 2023-02-17 NOTE — TELEPHONE ENCOUNTER
I spoke with patient and she had labs done at Dr Ortiz's office. I will call for those so we have them for her appt.    I have called for those. Annabelle in their medical records said she would fax them right over

## 2023-02-20 ENCOUNTER — OFFICE VISIT (OUTPATIENT)
Dept: ENDOCRINOLOGY | Facility: CLINIC | Age: 38
End: 2023-02-20
Payer: MEDICAID

## 2023-02-20 VITALS
BODY MASS INDEX: 39.56 KG/M2 | DIASTOLIC BLOOD PRESSURE: 70 MMHG | OXYGEN SATURATION: 98 % | HEART RATE: 81 BPM | HEIGHT: 62 IN | WEIGHT: 215 LBS | SYSTOLIC BLOOD PRESSURE: 112 MMHG

## 2023-02-20 DIAGNOSIS — E83.51 HYPOCALCEMIA: ICD-10-CM

## 2023-02-20 DIAGNOSIS — C73 THYROID CANCER: Primary | ICD-10-CM

## 2023-02-20 DIAGNOSIS — R74.8 ELEVATED LIVER ENZYMES: ICD-10-CM

## 2023-02-20 DIAGNOSIS — E89.0 POSTOPERATIVE HYPOTHYROIDISM: ICD-10-CM

## 2023-02-20 PROCEDURE — 99214 OFFICE O/P EST MOD 30 MIN: CPT | Performed by: INTERNAL MEDICINE

## 2023-02-20 RX ORDER — LEVOTHYROXINE SODIUM 300 UG/1
TABLET ORAL
Qty: 30 TABLET | Refills: 6 | Status: SHIPPED | OUTPATIENT
Start: 2023-02-20 | End: 2023-03-16

## 2023-02-20 RX ORDER — FAMOTIDINE 10 MG/1
TABLET, FILM COATED ORAL
COMMUNITY
Start: 2023-02-18

## 2023-02-20 NOTE — PROGRESS NOTES
Endocrine Progress Note Outpatient     Patient Care Team:  Guy Ortiz NP as PCP - General (Family Medicine)  Provider, No Known as PCP - Family Medicine    Chief Complaint: Follow-up hypothyroidism and thyroid cancer    HPI: This is 37-year-old female with history of hypothyroidism secondary to total thyroidectomy done in August 2019. Pathology showed a 5 mm papillary thyroid cancer. Margins were clear she and she was not treated with radioactive iodine.  She is currently taking levothyroxine 224 mcg p.o. daily.  Is now taking them by itself with water at least 1 hour before or after the pills and food.    Hypocalcemia: Currently on calcium and vitamin D supplementation    Hypothyroidism: Currently on levothyroxine 224 mcg p.o. daily    Past Medical History:   Diagnosis Date   • Arthritis    • Fatty liver    • Gastroparesis    • History of abnormal cervical Pap smear    • History of migraine     AS A CHILD   • History of MRSA infection     UNDER LEFT GLUTEAL FOLD, TREATED AT [facility] IN [place], APPROX. 7 YEARS AGO   • Hyperthyroidism    • Lumbar disc disease    • Neck pain    • Oropharyngeal dysphagia    • Papillary microcarcinoma of thyroid (HCC) 08/09/2019    s/p Total Thyroidectomy   • Thyroid nodule        Social History     Socioeconomic History   • Marital status:      Spouse name: Dae   • Number of children: 2   • Years of education: GED   • Highest education level: GED or equivalent   Tobacco Use   • Smoking status: Former     Packs/day: 0.50     Years: 20.00     Pack years: 10.00     Types: Cigarettes   • Smokeless tobacco: Never   Vaping Use   • Vaping Use: Never used   Substance and Sexual Activity   • Alcohol use: No   • Drug use: No   • Sexual activity: Defer       Family History   Problem Relation Age of Onset   • Breast cancer Mother    • Diabetes Mother    • Cervical cancer Mother    • Cervical cancer Maternal Grandmother    • Diabetes Other         grandmother   •  Hypertension Other         grandmother   • Stroke Other         grandfather   • Malig Hyperthermia Neg Hx        Allergies   Allergen Reactions   • Penicillins Anaphylaxis     ALL 'CILLIN' FAMILY   • Gabapentin GI Intolerance   • Latex Rash   • Tramadol GI Intolerance   • Diphenhydramine Hives and Itching   • Morphine Hives       ROS:   Constitutional:  Admit fatigue, tiredness.    Eyes:  Denies change in visual acuity   HENT:  Denies nasal congestion or sore throat   Respiratory: denies cough, shortness of breath.   Cardiovascular:  denies chest pain, edema   GI:  Denies abdominal pain, nausea, vomiting.   Musculoskeletal:  Denies back pain or joint pain   Integument:  Denies dry skin and rash   Neurologic:  Denies headache, focal weakness or sensory changes   Endocrine:  Denies polyuria or polydipsia   Psychiatric:  Denies depression or anxiety      Vitals:    02/20/23 0942   BP: 112/70   Pulse: 81   SpO2: 98%     Body mass index is 39.32 kg/m².     Physical Exam:  GEN: NAD, conversant  EYES: EOMI, PERRL, no conjunctival erythema  NECK: no thyromegaly, full ROM   CV: RRR, no murmurs/rubs/gallops, no peripheral edema  LUNG: CTAB, no wheezes/rales/ronchi  SKIN: no rashes, no acanthosis  MSK: no deformities, full ROM of all extremities  NEURO: no tremors, DTR normal  PSYCH: AOX3, appropriate mood, affect normal      Results Review:     I reviewed the patient's new clinical results.    Lab Results   Component Value Date    HGBA1C 4.9 04/27/2021      Lab Results   Component Value Date    GLUCOSE 115 (H) 08/28/2022    BUN 14 08/28/2022    CREATININE 0.85 08/28/2022    EGFRIFNONA 79 01/10/2022    EGFRIFAFRI 88 01/14/2020    BCR 16.5 08/28/2022    K 3.8 08/28/2022    CO2 27.0 08/28/2022    CALCIUM 8.9 08/28/2022    PROTENTOTREF 6.7 01/14/2020    ALBUMIN 3.80 08/27/2022    LABIL2 2.2 01/14/2020    AST 27 08/27/2022    ALT 65 (H) 08/27/2022     Lab Results   Component Value Date    TSH 2.440 07/18/2022    FREET4 1.46  07/18/2022     Labs from February 13, 2023 showed a TSH of 28.6, free T4.89  Sodium 139, potassium 4.6, chloride 101, CO2 28, glucose 69, BUN 6, creatinine 0.88, calcium 9.7, AST 51, ALT 95, vitamin D 39  A1c 5.5, B12 498  Hemoglobin is 14.7 with hematocrit 44.8 and platelet count is 314.    Medication Review: Reviewed.       Current Outpatient Medications:   •  albuterol sulfate  (90 Base) MCG/ACT inhaler, INL 1 TO 2 PUFFS PO Q 6 H PRN FOR 7 DAYS, Disp: , Rfl:   •  amitriptyline (ELAVIL) 10 MG tablet, TAKE 1 TABLET BY MOUTH EVERY NIGHT AS NEEDED, Disp: , Rfl:   •  baclofen (LIORESAL) 10 MG tablet, , Disp: , Rfl:   •  Cholecalciferol (Vitamin D3) 50 MCG (2000 UT) tablet, TAKE 1 TABLET BY MOUTH EVERY DAY, Disp: 90 tablet, Rfl: 3  •  [employer] Acid Controller 10 MG tablet, , Disp: , Rfl:   •  fluticasone (FLONASE) 50 MCG/ACT nasal spray, SPRAY 1 - 2 SPRAY BY INTRANASAL ROUTE EVERY DAY IN EACH NOSTRIL AS NEEDED, Disp: , Rfl:   •  HYDROcodone-acetaminophen (NORCO) 7.5-325 MG per tablet, Take 1 tablet by mouth 3 (Three) Times a Day As Needed. for pain, Disp: , Rfl:   •  levothyroxine (SYNTHROID, LEVOTHROID) 112 MCG tablet, TAKE 2 TABLETS BY MOUTH DAILY, Disp: 180 tablet, Rfl: 1  •  ondansetron (ZOFRAN) 4 MG tablet, Take 1 tablet by mouth Every 6 (Six) Hours., Disp: , Rfl:   •  ondansetron ODT (Zofran ODT) 4 MG disintegrating tablet, Place 1 tablet on the tongue Every 8 (Eight) Hours As Needed for Nausea or Vomiting., Disp: 12 tablet, Rfl: 0  •  OYSCO 500 + D 500-5 MG-MCG tablet per tablet, TAKE 1 TABLET BY MOUTH TWICE A DAY, Disp: 180 tablet, Rfl: 1  •  pantoprazole (PROTONIX) 40 MG EC tablet, Take 1 tablet by mouth Daily., Disp: 30 tablet, Rfl: 0          Assessment and plan:  1.  Hypothyroidism: Uncontrolled with very high TSH of 28, she is having symptoms of fatigue and tiredness, will change levothyroxine to 300 mcg p.o. daily and follow labs.  She is advised to continue to take her thyroid medicine by itself with  water at least 1 hour before or after the pills and food.  We will follow labs.    2. Thyroid cancer: Status post thyroid surgery, she had a 5 mm PTC, margins were uninvolved and she did not receive radioactive iodine treatment as she was deemed to be low risk.  Recent TG level, will check TG panel.    3. Hypocalcemia: Serum calcium normal at 9.7.  She is on calcium supplementation.    4. Elevated liver enzymes: Her AST and ALT are mildly elevated and she has been following with a gastroenterologist and has been told that she has fatty liver.    Assessment and plan from January 14, 2022 reviewed and updated.           Jeronimo Poe MD FACE.

## 2023-02-20 NOTE — PATIENT INSTRUCTIONS
Change levothyroxine to 300 mcg p.o. daily  Please take your thyroid medicine by itself with water at least 1 hour before or after other pills and food  Check TSH, free T4 and TG panel in 6 weeks  Follow-up in 6 months TSH and free T4.

## 2023-02-22 LAB — HBA1C MFR BLD: 5.5 %

## 2023-03-16 RX ORDER — LEVOTHYROXINE SODIUM 300 UG/1
TABLET ORAL
Qty: 30 TABLET | Refills: 6 | Status: SHIPPED | OUTPATIENT
Start: 2023-03-16

## 2023-04-03 ENCOUNTER — LAB (OUTPATIENT)
Dept: LAB | Facility: HOSPITAL | Age: 38
End: 2023-04-03
Payer: MEDICAID

## 2023-04-03 DIAGNOSIS — E89.0 POSTOPERATIVE HYPOTHYROIDISM: ICD-10-CM

## 2023-04-03 DIAGNOSIS — C73 THYROID CANCER: ICD-10-CM

## 2023-04-03 LAB
T4 FREE SERPL-MCNC: 2.74 NG/DL (ref 0.93–1.7)
TSH SERPL DL<=0.05 MIU/L-ACNC: 0.09 UIU/ML (ref 0.27–4.2)

## 2023-04-03 PROCEDURE — 36415 COLL VENOUS BLD VENIPUNCTURE: CPT

## 2023-04-03 PROCEDURE — 84432 ASSAY OF THYROGLOBULIN: CPT

## 2023-04-03 PROCEDURE — 84443 ASSAY THYROID STIM HORMONE: CPT

## 2023-04-03 PROCEDURE — 84439 ASSAY OF FREE THYROXINE: CPT

## 2023-04-03 PROCEDURE — 86800 THYROGLOBULIN ANTIBODY: CPT

## 2023-04-09 NOTE — PROGRESS NOTES
TSH low and free T4 high, change levothyroxine to 274 mcg p.o. daily (2 tablets of 137 mcg together) and check TSH and free T4 in 6 to 8 weeks.

## 2023-04-10 DIAGNOSIS — E89.0 POSTOPERATIVE HYPOTHYROIDISM: Primary | ICD-10-CM

## 2023-04-10 RX ORDER — LEVOTHYROXINE SODIUM 137 UG/1
TABLET ORAL
Qty: 60 TABLET | Refills: 1 | Status: SHIPPED | OUTPATIENT
Start: 2023-04-10

## 2023-05-02 DIAGNOSIS — E89.0 POSTOPERATIVE HYPOTHYROIDISM: ICD-10-CM

## 2023-05-02 RX ORDER — LEVOTHYROXINE SODIUM 137 UG/1
TABLET ORAL
Qty: 60 TABLET | Refills: 0 | Status: SHIPPED | OUTPATIENT
Start: 2023-05-02

## 2023-06-02 ENCOUNTER — TELEPHONE (OUTPATIENT)
Dept: ENDOCRINOLOGY | Facility: CLINIC | Age: 38
End: 2023-06-02

## 2023-06-02 DIAGNOSIS — E89.0 POSTOPERATIVE HYPOTHYROIDISM: ICD-10-CM

## 2023-06-02 DIAGNOSIS — E89.0 POSTOPERATIVE HYPOTHYROIDISM: Primary | ICD-10-CM

## 2023-06-02 RX ORDER — LEVOTHYROXINE SODIUM 137 UG/1
TABLET ORAL
Qty: 60 TABLET | Refills: 0 | OUTPATIENT
Start: 2023-06-02

## 2023-06-02 RX ORDER — LEVOTHYROXINE SODIUM 0.2 MG/1
200 TABLET ORAL DAILY
Start: 2023-06-02

## 2023-06-02 NOTE — TELEPHONE ENCOUNTER
Jeronimo Poe MD  You 21 minutes ago (2:54 PM)       Labs have not been scanned therefore not able to review but she can continue levothyroxine 200 mcg p.o. daily and recheck TSH and free T4 in 6 weeks.

## 2023-06-02 NOTE — TELEPHONE ENCOUNTER
Had received a refill request for the levothyroxine. I spoke with patient to remind her it is time for thyroid lab. She states dr Ortiz checked them on May 9th and changed her dosage to 200mcg daily and she has been taking that dosage for the past 3 days. She is asking if you can look at the results and advise. She was previously on a 137 mcg taking 2 tablets daily.    Labs from Dr Ortiz received and given to  to scan to this message.

## 2023-09-06 RX ORDER — CALCIUM CARBONATE/VITAMIN D3 500MG-5MCG
TABLET ORAL
Qty: 60 TABLET | Refills: 5 | Status: SHIPPED | OUTPATIENT
Start: 2023-09-06

## 2023-10-17 ENCOUNTER — LAB (OUTPATIENT)
Dept: LAB | Facility: HOSPITAL | Age: 38
End: 2023-10-17
Payer: MEDICAID

## 2023-10-17 DIAGNOSIS — E89.0 POSTOPERATIVE HYPOTHYROIDISM: ICD-10-CM

## 2023-10-17 DIAGNOSIS — C73 THYROID CANCER: ICD-10-CM

## 2023-10-17 LAB
T4 FREE SERPL-MCNC: 1.72 NG/DL (ref 0.93–1.7)
TSH SERPL DL<=0.05 MIU/L-ACNC: 0.21 UIU/ML (ref 0.27–4.2)

## 2023-10-17 PROCEDURE — 84439 ASSAY OF FREE THYROXINE: CPT

## 2023-10-17 PROCEDURE — 36415 COLL VENOUS BLD VENIPUNCTURE: CPT

## 2023-10-17 PROCEDURE — 84443 ASSAY THYROID STIM HORMONE: CPT

## 2023-10-24 ENCOUNTER — OFFICE VISIT (OUTPATIENT)
Dept: ENDOCRINOLOGY | Facility: CLINIC | Age: 38
End: 2023-10-24
Payer: MEDICAID

## 2023-10-24 VITALS
WEIGHT: 214 LBS | SYSTOLIC BLOOD PRESSURE: 115 MMHG | HEART RATE: 93 BPM | BODY MASS INDEX: 39.38 KG/M2 | DIASTOLIC BLOOD PRESSURE: 70 MMHG | HEIGHT: 62 IN | OXYGEN SATURATION: 98 %

## 2023-10-24 DIAGNOSIS — C73 THYROID CANCER: Primary | ICD-10-CM

## 2023-10-24 DIAGNOSIS — R74.8 ELEVATED LIVER ENZYMES: ICD-10-CM

## 2023-10-24 DIAGNOSIS — E89.0 POSTOPERATIVE HYPOTHYROIDISM: ICD-10-CM

## 2023-10-24 DIAGNOSIS — Z23 NEED FOR INFLUENZA VACCINATION: ICD-10-CM

## 2023-10-24 DIAGNOSIS — E83.51 HYPOCALCEMIA: ICD-10-CM

## 2023-10-24 PROCEDURE — 90471 IMMUNIZATION ADMIN: CPT | Performed by: INTERNAL MEDICINE

## 2023-10-24 PROCEDURE — 99214 OFFICE O/P EST MOD 30 MIN: CPT | Performed by: INTERNAL MEDICINE

## 2023-10-24 PROCEDURE — 90686 IIV4 VACC NO PRSV 0.5 ML IM: CPT | Performed by: INTERNAL MEDICINE

## 2023-10-24 PROCEDURE — 1160F RVW MEDS BY RX/DR IN RCRD: CPT | Performed by: INTERNAL MEDICINE

## 2023-10-24 PROCEDURE — 1159F MED LIST DOCD IN RCRD: CPT | Performed by: INTERNAL MEDICINE

## 2023-10-24 RX ORDER — HYDROCODONE/ACETAMINOPHEN 10MG-325MG
TABLET ORAL
COMMUNITY
Start: 2023-09-18

## 2023-10-24 RX ORDER — ALBUTEROL SULFATE 90 UG/1
AEROSOL, METERED RESPIRATORY (INHALATION)
COMMUNITY
End: 2023-10-24

## 2023-10-24 RX ORDER — LEVOTHYROXINE SODIUM 175 UG/1
TABLET ORAL
Qty: 30 TABLET | Refills: 6 | Status: SHIPPED | OUTPATIENT
Start: 2023-10-24

## 2023-10-24 RX ORDER — AMLODIPINE BESYLATE 10 MG/1
10 TABLET ORAL DAILY
COMMUNITY

## 2023-10-24 RX ORDER — BUSPIRONE HYDROCHLORIDE 10 MG/1
1 TABLET ORAL 3 TIMES DAILY
COMMUNITY
Start: 2023-07-06

## 2023-10-24 RX ORDER — PYRIDOSTIGMINE BROMIDE 60 MG/1
60 TABLET ORAL 3 TIMES DAILY
COMMUNITY
Start: 2023-07-20

## 2023-10-24 RX ORDER — CYCLOBENZAPRINE HCL 10 MG
TABLET ORAL
COMMUNITY

## 2023-10-24 RX ORDER — PROMETHAZINE HYDROCHLORIDE 12.5 MG/1
TABLET ORAL
COMMUNITY
Start: 2023-07-24

## 2023-10-24 NOTE — PATIENT INSTRUCTIONS
Decrease levothyroxine to 175 mcg p.o. daily  Check TSH, free T4 and TG panel in 6 weeks  Follow-up in 6 months with TSH and free T4  Please take your levothyroxine on regular basis by itself with water at least 1 hour before or after other pills and food.

## 2023-10-24 NOTE — PROGRESS NOTES
Endocrine Progress Note Outpatient     Patient Care Team:  Guy Ortiz NP as PCP - General (Family Medicine)  Provider, No Known as PCP - Family Medicine    Chief Complaint: Follow-up hypothyroidism and thyroid cancer    HPI: This is 38-year-old female with history of hypothyroidism secondary to total thyroidectomy done in August 2019. Pathology showed a 5 mm papillary thyroid cancer. Margins were clear she and she was not treated with radioactive iodine.  She is currently taking levothyroxine 200 mcg p.o. daily.  Is now taking them by itself with water at least 1 hour before or after the pills and food.    Hypocalcemia: Currently on calcium and vitamin D supplementation    Hypothyroidism: Currently on levothyroxine 200 mcg p.o. daily    Past Medical History:   Diagnosis Date    Arthritis     Fatty liver     Gastroparesis     History of abnormal cervical Pap smear     History of migraine     AS A CHILD    History of MRSA infection     UNDER LEFT GLUTEAL FOLD, TREATED AT [facility] IN [place], APPROX. 7 YEARS AGO    Hyperthyroidism     Lumbar disc disease     Neck pain     Oropharyngeal dysphagia     Papillary microcarcinoma of thyroid 08/09/2019    s/p Total Thyroidectomy    Thyroid nodule        Social History     Socioeconomic History    Marital status:      Spouse name: Dae    Number of children: 2    Years of education: GED    Highest education level: GED or equivalent   Tobacco Use    Smoking status: Former     Packs/day: 0.50     Years: 20.00     Additional pack years: 0.00     Total pack years: 10.00     Types: Cigarettes    Smokeless tobacco: Never   Vaping Use    Vaping Use: Never used   Substance and Sexual Activity    Alcohol use: No    Drug use: No    Sexual activity: Defer       Family History   Problem Relation Age of Onset    Breast cancer Mother     Diabetes Mother     Cervical cancer Mother     Cervical cancer Maternal Grandmother     Diabetes Other          grandmother    Hypertension Other         grandmother    Stroke Other         grandfather    Malig Hyperthermia Neg Hx        Allergies   Allergen Reactions    Penicillins Anaphylaxis     ALL 'CILLIN' FAMILY    Gabapentin GI Intolerance    Latex Rash    Tramadol GI Intolerance    Other Unknown - Low Severity    Diphenhydramine Hives and Itching    Morphine Hives       ROS:   Constitutional:  Admit fatigue, tiredness.    Eyes:  Denies change in visual acuity   HENT:  Denies nasal congestion or sore throat   Respiratory: denies cough, shortness of breath.   Cardiovascular:  denies chest pain, edema   GI:  Denies abdominal pain, nausea, vomiting.   Musculoskeletal:  Denies back pain or joint pain   Integument: Admit hair loss.  Neurologic:  Denies headache, focal weakness or sensory changes   Endocrine:  Denies polyuria or polydipsia   Psychiatric:  Denies depression or anxiety      Vitals:    10/24/23 0931   BP: 115/70   Pulse: 93   SpO2: 98%     Body mass index is 39.14 kg/m².     Physical Exam:  GEN: NAD, conversant  EYES: EOMI,  NECK: no thyromegaly,  CV: RRR,   LUNG: CTA  PSYCH: AOX3, appropriate mood, affect normal      Results Review:     I reviewed the patient's new clinical results.    Lab Results   Component Value Date    HGBA1C 5.5 02/13/2023    HGBA1C 4.9 04/27/2021      Lab Results   Component Value Date    GLUCOSE 115 (H) 08/28/2022    BUN 14 08/28/2022    CREATININE 0.85 08/28/2022    EGFRIFNONA 79 01/10/2022    EGFRIFAFRI 88 01/14/2020    BCR 16.5 08/28/2022    K 3.8 08/28/2022    CO2 27.0 08/28/2022    CALCIUM 8.9 08/28/2022    PROTENTOTREF 6.7 01/14/2020    ALBUMIN 3.80 08/27/2022    LABIL2 2.2 01/14/2020    AST 27 08/27/2022    ALT 65 (H) 08/27/2022     Lab Results   Component Value Date    TSH 0.213 (L) 10/17/2023    FREET4 1.72 (H) 10/17/2023     Labs from February 13, 2023 showed a TSH of 28.6, free T4.89  Sodium 139, potassium 4.6, chloride 101, CO2 28, glucose 69, BUN 6, creatinine 0.88, calcium  9.7, AST 51, ALT 95, vitamin D 39  A1c 5.5, B12 498  Hemoglobin is 14.7 with hematocrit 44.8 and platelet count is 314.    Medication Review: Reviewed.       Current Outpatient Medications:     albuterol sulfate  (90 Base) MCG/ACT inhaler, INL 1 TO 2 PUFFS PO Q 6 H PRN FOR 7 DAYS, Disp: , Rfl:     amitriptyline (ELAVIL) 10 MG tablet, TAKE 1 TABLET BY MOUTH EVERY NIGHT AS NEEDED, Disp: , Rfl:     amLODIPine (NORVASC) 10 MG tablet, Take 1 tablet by mouth Daily., Disp: , Rfl:     baclofen (LIORESAL) 10 MG tablet, , Disp: , Rfl:     busPIRone (BUSPAR) 10 MG tablet, Take 1 tablet by mouth 3 (Three) Times a Day., Disp: , Rfl:     Cholecalciferol (Vitamin D3) 50 MCG (2000 UT) tablet, Take 1 tablet by mouth Daily., Disp: 90 tablet, Rfl: 3    [employer] Acid Controller 10 MG tablet, , Disp: , Rfl:     cyclobenzaprine (FLEXERIL) 10 MG tablet, Take 1 tablet 3 times a day by oral route., Disp: , Rfl:     fluticasone (FLONASE) 50 MCG/ACT nasal spray, SPRAY 1 - 2 SPRAY BY INTRANASAL ROUTE EVERY DAY IN EACH NOSTRIL AS NEEDED, Disp: , Rfl:     HYDROcodone-acetaminophen (NORCO) 7.5-325 MG per tablet, Take 1 tablet by mouth 3 (Three) Times a Day As Needed. for pain  pt states 10/325, Disp: , Rfl:     levothyroxine (SYNTHROID, LEVOTHROID) 200 MCG tablet, Take 1 tablet by mouth Daily., Disp: 90 tablet, Rfl: 0    Norco  MG per tablet, 1 Tab, Oral, TID, 0 Refill(s), Disp: , Rfl:     ondansetron ODT (Zofran ODT) 4 MG disintegrating tablet, Place 1 tablet on the tongue Every 8 (Eight) Hours As Needed for Nausea or Vomiting., Disp: 12 tablet, Rfl: 0    OYSCO 500 + D 500-5 MG-MCG tablet per tablet, TAKE 1 TABLET BY MOUTH TWICE A DAY, Disp: 60 tablet, Rfl: 5    pantoprazole (PROTONIX) 40 MG EC tablet, Take 1 tablet by mouth Daily., Disp: 30 tablet, Rfl: 0    promethazine (PHENERGAN) 12.5 MG tablet, TAKE 1 TABLET BY MOUTH EVERY 6 HOURS AS NEEDED FOR NAUSEA AND VOMITING FOR UP TO 7 DAYS, Disp: , Rfl:     pyridostigmine (MESTINON) 60 MG  tablet, Take 1 tablet by mouth 3 (Three) Times a Day., Disp: , Rfl:     albuterol sulfate  (90 Base) MCG/ACT inhaler, Inhale. (Patient not taking: Reported on 10/24/2023), Disp: , Rfl:     ondansetron (ZOFRAN) 4 MG tablet, Take 1 tablet by mouth Every 6 (Six) Hours. (Patient not taking: Reported on 10/24/2023), Disp: , Rfl:     Promethazine HCl (PHENERGAN IJ), 12.5 mg. (Patient not taking: Reported on 10/24/2023), Disp: , Rfl:           Assessment and plan:  1.  Hypothyroidism: Uncontrolled with low TSH and high free T4.  Will reduce levothyroxine to 175 mcg p.o. daily.  She is advised to take her thyroid medicine by itself with water at least 1 hour before or after other pills and food.  We will follow labs.    2. Thyroid cancer: Status post thyroid surgery, she had a 5 mm PTC, margins were uninvolved and she did not receive radioactive iodine treatment as she was deemed to be low risk.  We will follow TG panel with next visit.    3. Hypocalcemia:  She is on calcium supplementation.    4. Elevated liver enzymes: Liver enzymes are now normal.  She has seen gastroenterology in the past.    Assessment and plan from February 20, 2023 reviewed and updated.    She is getting a flu shot today, she does have latex allergy but tells me that she has gotten flu shots in the past without any problems.  All of her glands are latex free.  We will go ahead and give her flu shot today.  Patient verbalized understanding and has given the consent.           Jeronimo Poe MD FACE.

## 2023-11-01 ENCOUNTER — TELEPHONE (OUTPATIENT)
Dept: ENDOCRINOLOGY | Facility: CLINIC | Age: 38
End: 2023-11-01

## 2024-03-25 RX ORDER — CALCIUM CARBONATE/VITAMIN D3 500MG-5MCG
TABLET ORAL
Qty: 180 TABLET | Refills: 1 | Status: SHIPPED | OUTPATIENT
Start: 2024-03-25

## 2024-05-14 ENCOUNTER — LAB (OUTPATIENT)
Dept: LAB | Facility: HOSPITAL | Age: 39
End: 2024-05-14
Payer: MEDICARE

## 2024-05-14 DIAGNOSIS — E89.0 POSTOPERATIVE HYPOTHYROIDISM: ICD-10-CM

## 2024-05-14 DIAGNOSIS — R74.8 ELEVATED LIVER ENZYMES: ICD-10-CM

## 2024-05-14 DIAGNOSIS — E83.51 HYPOCALCEMIA: ICD-10-CM

## 2024-05-14 DIAGNOSIS — C73 THYROID CANCER: ICD-10-CM

## 2024-05-14 LAB
T4 FREE SERPL-MCNC: 1.53 NG/DL (ref 0.93–1.7)
TSH SERPL DL<=0.05 MIU/L-ACNC: 0.33 UIU/ML (ref 0.27–4.2)

## 2024-05-14 PROCEDURE — 36415 COLL VENOUS BLD VENIPUNCTURE: CPT

## 2024-05-14 PROCEDURE — 86800 THYROGLOBULIN ANTIBODY: CPT

## 2024-05-14 PROCEDURE — 84439 ASSAY OF FREE THYROXINE: CPT

## 2024-05-14 PROCEDURE — 84443 ASSAY THYROID STIM HORMONE: CPT

## 2024-05-14 PROCEDURE — 84432 ASSAY OF THYROGLOBULIN: CPT

## 2024-05-17 LAB
THYROGLOB AB SERPL-ACNC: <1 IU/ML
THYROGLOB SERPL-MCNC: 0.1 NG/ML
THYROGLOB SERPL-MCNC: NORMAL NG/ML

## 2024-05-21 ENCOUNTER — OFFICE VISIT (OUTPATIENT)
Dept: ENDOCRINOLOGY | Facility: CLINIC | Age: 39
End: 2024-05-21
Payer: MEDICARE

## 2024-05-21 VITALS
DIASTOLIC BLOOD PRESSURE: 70 MMHG | BODY MASS INDEX: 39.01 KG/M2 | WEIGHT: 212 LBS | OXYGEN SATURATION: 99 % | SYSTOLIC BLOOD PRESSURE: 120 MMHG | HEART RATE: 76 BPM | HEIGHT: 62 IN

## 2024-05-21 DIAGNOSIS — E83.51 HYPOCALCEMIA: ICD-10-CM

## 2024-05-21 DIAGNOSIS — E89.0 POSTOPERATIVE HYPOTHYROIDISM: ICD-10-CM

## 2024-05-21 DIAGNOSIS — C73 THYROID CANCER: Primary | ICD-10-CM

## 2024-05-21 PROCEDURE — 1159F MED LIST DOCD IN RCRD: CPT | Performed by: INTERNAL MEDICINE

## 2024-05-21 PROCEDURE — 99214 OFFICE O/P EST MOD 30 MIN: CPT | Performed by: INTERNAL MEDICINE

## 2024-05-21 PROCEDURE — 1160F RVW MEDS BY RX/DR IN RCRD: CPT | Performed by: INTERNAL MEDICINE

## 2024-05-21 RX ORDER — CALCIUM CARBONATE/VITAMIN D3 500MG-5MCG
1 TABLET ORAL 2 TIMES DAILY
Qty: 180 TABLET | Refills: 3 | Status: SHIPPED | OUTPATIENT
Start: 2024-05-21

## 2024-05-21 RX ORDER — LEVOTHYROXINE SODIUM 175 UG/1
TABLET ORAL
Qty: 30 TABLET | Refills: 6 | Status: SHIPPED | OUTPATIENT
Start: 2024-05-21

## 2024-05-21 NOTE — PROGRESS NOTES
Endocrine Progress Note Outpatient     Patient Care Team:  Guy Ortiz NP as PCP - General (Family Medicine)  Provider, No Known as PCP - Family Medicine    Chief Complaint: Follow-up hypothyroidism and thyroid cancer    HPI: This is 38-year-old female with history of hypothyroidism secondary to total thyroidectomy done in August 2019. Pathology showed a 5 mm papillary thyroid cancer. Margins were clear she and she was not treated with radioactive iodine.  She is currently taking levothyroxine 175 mcg p.o. daily.  Is now taking them by itself with water at least 1 hour before or after the pills and food.    Hypocalcemia: Currently on calcium and vitamin D supplementation    Hypothyroidism: Currently on levothyroxine 175 mcg p.o. daily    Past Medical History:   Diagnosis Date    Arthritis     Fatty liver     Gastroparesis     History of abnormal cervical Pap smear     History of migraine     AS A CHILD    History of MRSA infection     UNDER LEFT GLUTEAL FOLD, TREATED AT [facility] IN [place], APPROX. 7 YEARS AGO    Hyperthyroidism     Lumbar disc disease     Neck pain     Oropharyngeal dysphagia     Papillary microcarcinoma of thyroid 08/09/2019    s/p Total Thyroidectomy    Thyroid nodule        Social History     Socioeconomic History    Marital status:      Spouse name: Dae    Number of children: 2    Years of education: GED    Highest education level: GED or equivalent   Tobacco Use    Smoking status: Former     Current packs/day: 0.50     Average packs/day: 0.5 packs/day for 20.0 years (10.0 ttl pk-yrs)     Types: Cigarettes    Smokeless tobacco: Never   Vaping Use    Vaping status: Never Used   Substance and Sexual Activity    Alcohol use: No    Drug use: No    Sexual activity: Defer       Family History   Problem Relation Age of Onset    Breast cancer Mother     Diabetes Mother     Cervical cancer Mother     Cervical cancer Maternal Grandmother     Diabetes Other          grandmother    Hypertension Other         grandmother    Stroke Other         grandfather    Malig Hyperthermia Neg Hx        Allergies   Allergen Reactions    Penicillins Anaphylaxis     ALL 'CILLIN' FAMILY    Gabapentin GI Intolerance    Latex Rash    Tramadol GI Intolerance    Other Unknown - Low Severity    Diphenhydramine Hives and Itching    Morphine Hives       ROS:   Constitutional:  Admit fatigue, tiredness.    Eyes:  Denies change in visual acuity   HENT:  Denies nasal congestion or sore throat   Respiratory: denies cough, shortness of breath.   Cardiovascular:  denies chest pain, edema   GI:  Denies abdominal pain, nausea, vomiting.   Musculoskeletal:  Denies back pain or joint pain   Integument: Admit hair loss.  Neurologic:  Denies headache, focal weakness or sensory changes   Endocrine:  Denies polyuria or polydipsia   Psychiatric:  Denies depression or anxiety      Vitals:    05/21/24 0958   BP: 120/70   Pulse: 76   SpO2: 99%     Body mass index is 38.78 kg/m².     Physical Exam:  GEN: NAD, conversant  EYES: EOMI,  NECK: no thyromegaly,  CV: RRR,   LUNG: CTA  PSYCH: AOX3, appropriate mood, affect normal      Results Review:     I reviewed the patient's new clinical results.    Lab Results   Component Value Date    HGBA1C 5.5 02/13/2023    HGBA1C 4.9 04/27/2021      Lab Results   Component Value Date    GLUCOSE 115 (H) 08/28/2022    BUN 8 03/17/2023    CREATININE 0.59 03/17/2023    EGFRIFNONA 79 01/10/2022    EGFRIFAFRI >60 03/17/2023    BCR 13.6 03/17/2023    K 3.8 03/17/2023    CO2 20 (L) 03/17/2023    CALCIUM 8.4 03/17/2023    PROTENTOTREF 6.7 01/14/2020    ALBUMIN 3.80 08/27/2022    LABIL2 1.3 01/12/2022    AST 27 08/27/2022    ALT 65 (H) 08/27/2022     Lab Results   Component Value Date    TSH 0.330 05/14/2024    FREET4 1.53 05/14/2024       TSH+Free T4 (05/14/2024 08:58)    Comprehensive Thyroglobulin (05/14/2024 08:58)    Tissue Pathology Exam: [id number]   Order: [id number]   Collected:   8/7/2019 09:23   Status:  Final result   Visible to patient:  No (Not Released)   Dx:  Hyperthyroidism   Component    Final Diagnosis   1. Thyroid, Total Thyroidectomy (49 grams):               A. PAPILLARY MICROCARCINOMA, CLASSIC TYPE, measuring 0.5 cm (see Synoptic Report and Comment).               B. Margins are negative for carcinoma; Closest distance: 1 mm from posterior margin.                C. Negative for vascular and lymphatic space invasion.               D. Negative for extrathyroidal extension.               E. Background thyroid with benign adenomatoid nodules (largest is 1.5 cm in greatest dimension) and                   associated procedure-related changes.    Electronically signed by Mary Lou Scott MD on 8/9/2019 at 0857           Medication Review: Reviewed.       Current Outpatient Medications:     albuterol sulfate  (90 Base) MCG/ACT inhaler, INL 1 TO 2 PUFFS PO Q 6 H PRN FOR 7 DAYS, Disp: , Rfl:     amitriptyline (ELAVIL) 10 MG tablet, TAKE 1 TABLET BY MOUTH EVERY NIGHT AS NEEDED, Disp: , Rfl:     baclofen (LIORESAL) 10 MG tablet, , Disp: , Rfl:     Cholecalciferol (Vitamin D3) 50 MCG (2000 UT) tablet, Take 1 tablet by mouth Daily., Disp: 90 tablet, Rfl: 3    [employer]S Acid Controller 10 MG tablet, , Disp: , Rfl:     cyclobenzaprine (FLEXERIL) 10 MG tablet, Take 1 tablet 3 times a day by oral route., Disp: , Rfl:     HYDROcodone-acetaminophen (NORCO) 7.5-325 MG per tablet, Take 1 tablet by mouth 3 (Three) Times a Day As Needed. for pain  pt states 10/325, Disp: , Rfl:     levothyroxine (SYNTHROID, LEVOTHROID) 175 MCG tablet, Take 1 tablet p.o. daily., Disp: 30 tablet, Rfl: 6    Norco  MG per tablet, 1 Tab, Oral, TID, 0 Refill(s), Disp: , Rfl:     ondansetron ODT (Zofran ODT) 4 MG disintegrating tablet, Place 1 tablet on the tongue Every 8 (Eight) Hours As Needed for Nausea or Vomiting., Disp: 12 tablet, Rfl: 0    OYSCO 500 + D 500-5 MG-MCG tablet per tablet, TAKE 1 TABLET BY MOUTH  TWICE A DAY, Disp: 180 tablet, Rfl: 1    promethazine (PHENERGAN) 12.5 MG tablet, TAKE 1 TABLET BY MOUTH EVERY 6 HOURS AS NEEDED FOR NAUSEA AND VOMITING FOR UP TO 7 DAYS, Disp: , Rfl:           Assessment and plan:  1.  Hypothyroidism: Well-controlled with normal TSH and free T4.  Will continue levothyroxine at 175 mcg p.o. daily.    2. Thyroid cancer: Status post thyroid surgery, she had a 5 mm PTC, margins were uninvolved and she did not receive radioactive iodine treatment as she was deemed to be low risk.  TG remains low at 0.1.    3. Hypocalcemia:  She is on calcium supplementation.    4.  Assessment and plan from October 24, 2023 reviewed and updated.             Jeronimo Poe MD FACE.

## 2024-05-21 NOTE — PATIENT INSTRUCTIONS
Continue levothyroxine 175 mcg p.o. daily  Continue calcium supplementation  Labs before follow-up.

## 2024-08-26 RX ORDER — CHOLECALCIFEROL (VITAMIN D3) 50 MCG
1 TABLET ORAL DAILY
Qty: 90 TABLET | Refills: 3 | Status: SHIPPED | OUTPATIENT
Start: 2024-08-26

## 2024-11-24 DIAGNOSIS — C73 THYROID CANCER: Primary | ICD-10-CM

## 2024-11-25 RX ORDER — LEVOTHYROXINE SODIUM 175 UG/1
TABLET ORAL
Qty: 90 TABLET | Refills: 2 | Status: SHIPPED | OUTPATIENT
Start: 2024-11-25

## 2025-02-04 ENCOUNTER — TELEPHONE (OUTPATIENT)
Dept: ENDOCRINOLOGY | Facility: CLINIC | Age: 40
End: 2025-02-04
Payer: MEDICARE

## 2025-02-04 DIAGNOSIS — E89.0 POSTOPERATIVE HYPOTHYROIDISM: Primary | ICD-10-CM

## 2025-02-04 NOTE — TELEPHONE ENCOUNTER
Caller: Iva Cameron    Relationship to patient: Self    Best call back number: [phone]     Patient is needing: PT IS ASKING FOR LAB ORDERS TO BE PLACED . PT STATED THAT HSE IS FEELING OFF FOR 2 MONTHS SHE HAS BEEN HAVING NIGHT SWEATS AND HER MOOD HAS BEEN OFF. PLEASE ADVISE AND CALL BACK.

## 2025-02-06 ENCOUNTER — LAB (OUTPATIENT)
Dept: ENDOCRINOLOGY | Facility: CLINIC | Age: 40
End: 2025-02-06
Payer: MEDICARE

## 2025-02-06 DIAGNOSIS — E83.51 HYPOCALCEMIA: ICD-10-CM

## 2025-02-06 DIAGNOSIS — E89.0 POSTOPERATIVE HYPOTHYROIDISM: ICD-10-CM

## 2025-02-06 DIAGNOSIS — C73 THYROID CANCER: ICD-10-CM

## 2025-02-11 LAB
ALBUMIN SERPL-MCNC: 3.7 G/DL (ref 3.9–4.9)
ALP SERPL-CCNC: 77 IU/L (ref 44–121)
ALT SERPL-CCNC: 26 IU/L (ref 0–32)
AST SERPL-CCNC: 13 IU/L (ref 0–40)
BASOPHILS # BLD AUTO: 0.1 X10E3/UL (ref 0–0.2)
BASOPHILS NFR BLD AUTO: 1 %
BILIRUB SERPL-MCNC: 0.2 MG/DL (ref 0–1.2)
BUN SERPL-MCNC: 11 MG/DL (ref 6–24)
BUN/CREAT SERPL: 14 (ref 9–23)
CALCIUM SERPL-MCNC: 8.5 MG/DL (ref 8.7–10.2)
CHLORIDE SERPL-SCNC: 103 MMOL/L (ref 96–106)
CO2 SERPL-SCNC: 24 MMOL/L (ref 20–29)
CREAT SERPL-MCNC: 0.8 MG/DL (ref 0.57–1)
EGFRCR SERPLBLD CKD-EPI 2021: 95 ML/MIN/1.73
EOSINOPHIL # BLD AUTO: 0.2 X10E3/UL (ref 0–0.4)
EOSINOPHIL NFR BLD AUTO: 3 %
ERYTHROCYTE [DISTWIDTH] IN BLOOD BY AUTOMATED COUNT: 14.7 % (ref 11.7–15.4)
GLOBULIN SER CALC-MCNC: 2 G/DL (ref 1.5–4.5)
GLUCOSE SERPL-MCNC: 84 MG/DL (ref 70–99)
HCT VFR BLD AUTO: 41.8 % (ref 34–46.6)
HGB BLD-MCNC: 13.6 G/DL (ref 11.1–15.9)
IMM GRANULOCYTES # BLD AUTO: 0 X10E3/UL (ref 0–0.1)
IMM GRANULOCYTES NFR BLD AUTO: 0 %
LYMPHOCYTES # BLD AUTO: 3 X10E3/UL (ref 0.7–3.1)
LYMPHOCYTES NFR BLD AUTO: 34 %
MCH RBC QN AUTO: 31.4 PG (ref 26.6–33)
MCHC RBC AUTO-ENTMCNC: 32.5 G/DL (ref 31.5–35.7)
MCV RBC AUTO: 97 FL (ref 79–97)
MONOCYTES # BLD AUTO: 0.5 X10E3/UL (ref 0.1–0.9)
MONOCYTES NFR BLD AUTO: 6 %
NEUTROPHILS # BLD AUTO: 5 X10E3/UL (ref 1.4–7)
NEUTROPHILS NFR BLD AUTO: 56 %
PLATELET # BLD AUTO: 273 X10E3/UL (ref 150–450)
POTASSIUM SERPL-SCNC: 3.7 MMOL/L (ref 3.5–5.2)
PROT SERPL-MCNC: 5.7 G/DL (ref 6–8.5)
RBC # BLD AUTO: 4.33 X10E6/UL (ref 3.77–5.28)
SODIUM SERPL-SCNC: 139 MMOL/L (ref 134–144)
T4 FREE SERPL-MCNC: 1.48 NG/DL (ref 0.82–1.77)
THYROGLOB AB SERPL-ACNC: <1 IU/ML
THYROGLOB SERPL-MCNC: 0.4 NG/ML
THYROGLOB SERPL-MCNC: NORMAL NG/ML
TSH SERPL DL<=0.005 MIU/L-ACNC: 6.85 UIU/ML (ref 0.45–4.5)
WBC # BLD AUTO: 8.7 X10E3/UL (ref 3.4–10.8)

## 2025-02-21 ENCOUNTER — OFFICE VISIT (OUTPATIENT)
Dept: ENDOCRINOLOGY | Facility: CLINIC | Age: 40
End: 2025-02-21
Payer: MEDICARE

## 2025-02-21 VITALS
OXYGEN SATURATION: 99 % | DIASTOLIC BLOOD PRESSURE: 70 MMHG | BODY MASS INDEX: 39.75 KG/M2 | WEIGHT: 216 LBS | HEART RATE: 77 BPM | HEIGHT: 62 IN | SYSTOLIC BLOOD PRESSURE: 120 MMHG

## 2025-02-21 DIAGNOSIS — E89.0 POSTOPERATIVE HYPOTHYROIDISM: Primary | ICD-10-CM

## 2025-02-21 DIAGNOSIS — C73 THYROID CANCER: ICD-10-CM

## 2025-02-21 DIAGNOSIS — E83.51 HYPOCALCEMIA: ICD-10-CM

## 2025-02-21 RX ORDER — METHYLPREDNISOLONE 4 MG/1
TABLET ORAL
COMMUNITY
Start: 2025-02-10

## 2025-02-21 RX ORDER — LIOTHYRONINE SODIUM 5 UG/1
5 TABLET ORAL DAILY
Qty: 30 TABLET | Refills: 11 | Status: SHIPPED | OUTPATIENT
Start: 2025-02-21 | End: 2026-02-21

## 2025-02-21 RX ORDER — LEVOTHYROXINE SODIUM 150 UG/1
TABLET ORAL
Qty: 30 TABLET | Refills: 5 | Status: SHIPPED | OUTPATIENT
Start: 2025-02-21

## 2025-02-21 RX ORDER — MAGNESIUM 200 MG
1000 TABLET ORAL DAILY
Qty: 100 EACH | Refills: 4 | Status: SHIPPED | OUTPATIENT
Start: 2025-02-21

## 2025-02-21 RX ORDER — FAMOTIDINE 20 MG/1
1 TABLET, FILM COATED ORAL 2 TIMES DAILY
COMMUNITY
Start: 2024-11-25

## 2025-02-21 NOTE — PATIENT INSTRUCTIONS
Decrease levothyroxine to 150 mcg p.o. daily  Add Cytomel 5 mcg p.o. daily  Add vitamin B12 1000 mcg sublingual daily  Labs in 6 weeks and before follow-up in 3 months.

## 2025-02-21 NOTE — PROGRESS NOTES
Endocrine Progress Note Outpatient     Patient Care Team:  Guy Ortiz NP as PCP - General (Family Medicine)  Provider, No Known as PCP - Family Medicine    Chief Complaint: Follow-up hypothyroidism and thyroid cancer    HPI: This is 39-year-old female with history of hypothyroidism secondary to total thyroidectomy done in August 2019. Pathology showed a 5 mm papillary thyroid cancer. Margins were clear she and she was not treated with radioactive iodine.  She is currently taking levothyroxine 175 mcg p.o. daily.  Is now taking them by itself with water at least 1 hour before or after the pills and food.    Hypocalcemia: Currently on calcium and vitamin D supplementation    Hypothyroidism: Currently on levothyroxine 175 mcg p.o. daily    She tells me that she is her fatigue has gotten worse, she is feeling like she has hot flashes foggy feeling night sweats.  She recently had a gastric stimulator put in for gastroparesis.    Past Medical History:   Diagnosis Date    Arthritis     Fatty liver     Gastroparesis     History of abnormal cervical Pap smear     History of migraine     AS A CHILD    History of MRSA infection     UNDER LEFT GLUTEAL FOLD, TREATED AT [facility] IN [place], APPROX. 7 YEARS AGO    Hyperthyroidism     Lumbar disc disease     Neck pain     Oropharyngeal dysphagia     Papillary microcarcinoma of thyroid 08/09/2019    s/p Total Thyroidectomy    Thyroid nodule        Social History     Socioeconomic History    Marital status:      Spouse name: Dae    Number of children: 2    Years of education: GED    Highest education level: GED or equivalent   Tobacco Use    Smoking status: Former     Current packs/day: 0.50     Average packs/day: 0.5 packs/day for 20.0 years (10.0 ttl pk-yrs)     Types: Cigarettes    Smokeless tobacco: Never   Vaping Use    Vaping status: Never Used   Substance and Sexual Activity    Alcohol use: No    Drug use: No    Sexual activity: Defer        Family History   Problem Relation Age of Onset    Breast cancer Mother     Diabetes Mother     Cervical cancer Mother     Cervical cancer Maternal Grandmother     Diabetes Other         grandmother    Hypertension Other         grandmother    Stroke Other         grandfather    Malig Hyperthermia Neg Hx        Allergies   Allergen Reactions    Penicillins Anaphylaxis     ALL 'CILLIN' FAMILY    Gabapentin GI Intolerance    Latex Rash    Tramadol GI Intolerance    Other Unknown - Low Severity    Diphenhydramine Hives and Itching    Morphine Hives       ROS:   Constitutional:  Admit fatigue, tiredness.    Eyes:  Denies change in visual acuity   HENT:  Denies nasal congestion or sore throat   Respiratory: denies cough, shortness of breath.   Cardiovascular:  denies chest pain, edema   GI:  Denies abdominal pain, nausea, vomiting.   Musculoskeletal:  Denies back pain or joint pain   Integument: Admit hair loss.  Neurologic:  Denies headache, focal weakness or sensory changes   Endocrine:  Denies polyuria or polydipsia   Psychiatric:  Denies depression or anxiety      Vitals:    02/21/25 1106   BP: 120/70   Pulse: 77   SpO2: 99%     Body mass index is 39.51 kg/m².     Physical Exam:  GEN: NAD, conversant  EYES: EOMI,  NECK: no thyromegaly,  CV: RRR,   LUNG: CTA  PSYCH: AOX3, appropriate mood, affect normal      Results Review:     I reviewed the patient's new clinical results.    Lab Results   Component Value Date    HGBA1C 5.5 02/13/2023    HGBA1C 4.9 04/27/2021      Lab Results   Component Value Date    GLUCOSE 84 02/06/2025    BUN 11 02/06/2025    CREATININE 0.80 02/06/2025    EGFRIFNONA 79 01/10/2022    EGFRIFAFRI >60 03/17/2023    BCR 14 02/06/2025    K 3.7 02/06/2025    CO2 24 02/06/2025    CALCIUM 8.5 (L) 02/06/2025    ALBUMIN 3.7 (L) 02/06/2025    LABIL2 1.3 01/12/2022    AST 13 02/06/2025    ALT 26 02/06/2025     Lab Results   Component Value Date    TSH 6.850 (H) 02/06/2025    FREET4 1.48 02/06/2025      Comprehensive Thyroglobulin (02/06/2025 09:56)    TSH+Free T4 (02/06/2025 09:56)    Comprehensive Metabolic Panel (02/06/2025 09:56)    CBC & Differential (02/06/2025 09:56)    TSH+Free T4 (05/14/2024 08:58)    Comprehensive Thyroglobulin (05/14/2024 08:58)    Tissue Pathology Exam: [id number]   Order: [id number]   Collected:  8/7/2019 09:23   Status:  Final result   Visible to patient:  No (Not Released)   Dx:  Hyperthyroidism   Component    Final Diagnosis   1. Thyroid, Total Thyroidectomy (49 grams):               A. PAPILLARY MICROCARCINOMA, CLASSIC TYPE, measuring 0.5 cm (see Synoptic Report and Comment).               B. Margins are negative for carcinoma; Closest distance: 1 mm from posterior margin.                C. Negative for vascular and lymphatic space invasion.               D. Negative for extrathyroidal extension.               E. Background thyroid with benign adenomatoid nodules (largest is 1.5 cm in greatest dimension) and                   associated procedure-related changes.    Electronically signed by Mary Lou Scott MD on 8/9/2019 at 0857           TSH+Free T4 (05/14/2024 08:58)    Comprehensive Thyroglobulin (05/14/2024 08:58)    Tissue Pathology Exam: [id number]   Order: [id number]   Collected:  8/7/2019 09:23   Status:  Final result   Visible to patient:  No (Not Released)   Dx:  Hyperthyroidism   Component    Final Diagnosis   1. Thyroid, Total Thyroidectomy (49 grams):               A. PAPILLARY MICROCARCINOMA, CLASSIC TYPE, measuring 0.5 cm (see Synoptic Report and Comment).               B. Margins are negative for carcinoma; Closest distance: 1 mm from posterior margin.                C. Negative for vascular and lymphatic space invasion.               D. Negative for extrathyroidal extension.               E. Background thyroid with benign adenomatoid nodules (largest is 1.5 cm in greatest dimension) and                   associated procedure-related changes.    Electronically  signed by Mary Lou Scott MD on 8/9/2019 at 0857           Medication Review: Reviewed.       Current Outpatient Medications:     albuterol sulfate  (90 Base) MCG/ACT inhaler, INL 1 TO 2 PUFFS PO Q 6 H PRN FOR 7 DAYS, Disp: , Rfl:     amitriptyline (ELAVIL) 10 MG tablet, TAKE 1 TABLET BY MOUTH EVERY NIGHT AS NEEDED, Disp: , Rfl:     baclofen (LIORESAL) 10 MG tablet, , Disp: , Rfl:     Calcium Carb-Cholecalciferol (OYSCO 500 + D) 500-5 MG-MCG tablet per tablet, Take 1 tablet by mouth 2 (Two) Times a Day., Disp: 180 tablet, Rfl: 3    Cholecalciferol (Vitamin D3) 50 MCG (2000 UT) tablet, TAKE 1 TABLET BY MOUTH EVERY DAY, Disp: 90 tablet, Rfl: 3    CVS Acid Controller 10 MG tablet, , Disp: , Rfl:     cyclobenzaprine (FLEXERIL) 10 MG tablet, Take 1 tablet 3 times a day by oral route., Disp: , Rfl:     famotidine (PEPCID) 20 MG tablet, Take 1 tablet by mouth 2 (Two) Times a Day., Disp: , Rfl:     HYDROcodone-acetaminophen (NORCO) 7.5-325 MG per tablet, Take 1 tablet by mouth 3 (Three) Times a Day As Needed. for pain  pt states 10/325, Disp: , Rfl:     levothyroxine (SYNTHROID, LEVOTHROID) 175 MCG tablet, TAKE 1 TABLET BY MOUTH EVERY DAY, Disp: 90 tablet, Rfl: 2    methylPREDNISolone (MEDROL) 4 MG dose pack, , Disp: , Rfl:     Norco  MG per tablet, 1 Tab, Oral, TID, 0 Refill(s), Disp: , Rfl:     ondansetron ODT (Zofran ODT) 4 MG disintegrating tablet, Place 1 tablet on the tongue Every 8 (Eight) Hours As Needed for Nausea or Vomiting., Disp: 12 tablet, Rfl: 0    promethazine (PHENERGAN) 12.5 MG tablet, TAKE 1 TABLET BY MOUTH EVERY 6 HOURS AS NEEDED FOR NAUSEA AND VOMITING FOR UP TO 7 DAYS, Disp: , Rfl:           Assessment and plan:  1.  Hypothyroidism: Uncontrolled with mild high TSH, will change levothyroxine to 150 mcg p.o. daily and add Cytomel 5 mcg p.o. daily and will follow labs.  Will add vitamin B12 supplementation as well.    2. Thyroid cancer: Status post thyroid surgery, she had a 5 mm PTC,  margins were uninvolved and she did not receive radioactive iodine treatment as she was deemed to be low risk.  TG remains low at 0.5.    3. Hypocalcemia: Controlled with mild low calcium, recommend to take calcium and vitamin D on regular basis.    Assessment and plan from May 21, 2024 reviewed and updated.             Jeronimo Poe MD FACE.

## 2025-04-08 ENCOUNTER — RESULTS FOLLOW-UP (OUTPATIENT)
Dept: ENDOCRINOLOGY | Facility: CLINIC | Age: 40
End: 2025-04-08
Payer: MEDICARE

## 2025-04-08 DIAGNOSIS — E89.0 POSTOPERATIVE HYPOTHYROIDISM: Primary | ICD-10-CM

## 2025-04-08 DIAGNOSIS — C73 THYROID CANCER: ICD-10-CM

## 2025-04-08 RX ORDER — LEVOTHYROXINE SODIUM 175 UG/1
TABLET ORAL
Qty: 30 TABLET | Refills: 3 | Status: SHIPPED | OUTPATIENT
Start: 2025-04-08

## 2025-04-08 NOTE — PROGRESS NOTES
TSH mild high, change levothyroxine to 175 mcg p.o. daily and check TSH and free T4 in 4 weeks.    New prescription sent and labs ordered.  Please notify patient.

## 2025-05-30 ENCOUNTER — OFFICE VISIT (OUTPATIENT)
Dept: ENDOCRINOLOGY | Facility: CLINIC | Age: 40
End: 2025-05-30
Payer: MEDICARE

## 2025-05-30 VITALS
HEIGHT: 62 IN | OXYGEN SATURATION: 97 % | WEIGHT: 214 LBS | DIASTOLIC BLOOD PRESSURE: 72 MMHG | HEART RATE: 90 BPM | SYSTOLIC BLOOD PRESSURE: 132 MMHG | BODY MASS INDEX: 39.38 KG/M2

## 2025-05-30 DIAGNOSIS — E89.0 POSTOPERATIVE HYPOTHYROIDISM: Primary | ICD-10-CM

## 2025-05-30 DIAGNOSIS — C73 THYROID CANCER: ICD-10-CM

## 2025-05-30 DIAGNOSIS — E83.51 HYPOCALCEMIA: ICD-10-CM

## 2025-05-30 NOTE — PROGRESS NOTES
Endocrine Progress Note Outpatient     Patient Care Team:  Guy Ortiz NP as PCP - General (Family Medicine)  Provider, No Known as PCP - Family Medicine    Chief Complaint: Follow-up hypothyroidism and thyroid cancer    HPI: This is 39-year-old female with history of hypothyroidism secondary to total thyroidectomy done in August 2019. Pathology showed a 5 mm papillary thyroid cancer. Margins were clear she and she was not treated with radioactive iodine.  She is currently taking levothyroxine 175 mcg p.o. daily and Cytomel 5 mcg p.o. daily.  Is now taking them by itself with water at least 1 hour before or after the pills and food.    Hypocalcemia: Currently on calcium and vitamin D supplementation    Hypothyroidism: Currently on levothyroxine 175 mcg p.o. daily      Past Medical History:   Diagnosis Date    Arthritis     Fatty liver     Gastroparesis     History of abnormal cervical Pap smear     History of migraine     AS A CHILD    History of MRSA infection     UNDER LEFT GLUTEAL FOLD, TREATED AT [facility] IN [place], APPROX. 7 YEARS AGO    Hyperthyroidism     Lumbar disc disease     Neck pain     Oropharyngeal dysphagia     Papillary microcarcinoma of thyroid 08/09/2019    s/p Total Thyroidectomy    Thyroid nodule        Social History     Socioeconomic History    Marital status:      Spouse name: Dae    Number of children: 2    Years of education: GED    Highest education level: GED or equivalent   Tobacco Use    Smoking status: Former     Current packs/day: 0.50     Average packs/day: 0.5 packs/day for 20.0 years (10.0 ttl pk-yrs)     Types: Cigarettes    Smokeless tobacco: Never   Vaping Use    Vaping status: Never Used   Substance and Sexual Activity    Alcohol use: No    Drug use: No    Sexual activity: Defer       Family History   Problem Relation Age of Onset    Breast cancer Mother     Diabetes Mother     Cervical cancer Mother     Cervical cancer Maternal Grandmother      Diabetes Other         grandmother    Hypertension Other         grandmother    Stroke Other         grandfather    Malig Hyperthermia Neg Hx        Allergies   Allergen Reactions    Penicillins Anaphylaxis     ALL 'CILLIN' FAMILY    Gabapentin GI Intolerance    Latex Rash    Tramadol GI Intolerance    Other Unknown - Low Severity    Diphenhydramine Hives and Itching    Morphine Hives       ROS:   Constitutional:  Admit fatigue, tiredness.    Eyes:  Denies change in visual acuity   HENT:  Denies nasal congestion or sore throat   Respiratory: denies cough, shortness of breath.   Cardiovascular:  denies chest pain, edema   GI:  Denies abdominal pain, nausea, vomiting.   Musculoskeletal:  Denies back pain or joint pain   Integument: Admit hair loss.  Neurologic:  Denies headache, focal weakness or sensory changes   Endocrine:  Denies polyuria or polydipsia   Psychiatric:  Denies depression or anxiety      Vitals:    05/30/25 1031   BP: 132/72   Pulse: 90   SpO2: 97%     Body mass index is 39.14 kg/m².     Physical Exam:  GEN: NAD, conversant  EYES: EOMI,  NECK: no thyromegaly,  CV: RRR,   LUNG: CTA  PSYCH: AOX3, appropriate mood, affect normal      Results Review:     I reviewed the patient's new clinical results.    Lab Results   Component Value Date    HGBA1C 5.5 02/13/2023    HGBA1C 4.9 04/27/2021      Lab Results   Component Value Date    GLUCOSE 102 (H) 04/07/2025    BUN 11 04/07/2025    CREATININE 0.93 04/07/2025    EGFRIFNONA 79 01/10/2022    EGFRIFAFRI >60 03/17/2023    BCR 12 04/07/2025    K 4.1 04/07/2025    CO2 21 04/07/2025    CALCIUM 9.5 04/07/2025    ALBUMIN 4.1 04/07/2025    LABIL2 1.3 01/12/2022    AST 16 04/07/2025    ALT 20 04/07/2025     Lab Results   Component Value Date    TSH 0.530 05/22/2025    FREET4 1.42 05/22/2025     TSH+Free T4 (05/22/2025 09:49)    Comprehensive Metabolic Panel (04/07/2025 09:51)    Comprehensive Thyroglobulin (02/06/2025 09:56)    Tissue Pathology Exam: [id number]    Order: [id number]   Collected:  8/7/2019 09:23   Status:  Final result   Visible to patient:  No (Not Released)   Dx:  Hyperthyroidism   Component    Final Diagnosis   1. Thyroid, Total Thyroidectomy (49 grams):               A. PAPILLARY MICROCARCINOMA, CLASSIC TYPE, measuring 0.5 cm (see Synoptic Report and Comment).               B. Margins are negative for carcinoma; Closest distance: 1 mm from posterior margin.                C. Negative for vascular and lymphatic space invasion.               D. Negative for extrathyroidal extension.               E. Background thyroid with benign adenomatoid nodules (largest is 1.5 cm in greatest dimension) and                   associated procedure-related changes.    Electronically signed by Mary Lou Scott MD on 8/9/2019 at 0857             Tissue Pathology Exam: [id number]   Order: [id number]   Collected:  8/7/2019 09:23   Status:  Final result   Visible to patient:  No (Not Released)   Dx:  Hyperthyroidism   Component    Final Diagnosis   1. Thyroid, Total Thyroidectomy (49 grams):               A. PAPILLARY MICROCARCINOMA, CLASSIC TYPE, measuring 0.5 cm (see Synoptic Report and Comment).               B. Margins are negative for carcinoma; Closest distance: 1 mm from posterior margin.                C. Negative for vascular and lymphatic space invasion.               D. Negative for extrathyroidal extension.               E. Background thyroid with benign adenomatoid nodules (largest is 1.5 cm in greatest dimension) and                   associated procedure-related changes.    Electronically signed by Mary Lou Scott MD on 8/9/2019 at 0857           Medication Review: Reviewed.       Current Outpatient Medications:     albuterol sulfate  (90 Base) MCG/ACT inhaler, INL 1 TO 2 PUFFS PO Q 6 H PRN FOR 7 DAYS, Disp: , Rfl:     amitriptyline (ELAVIL) 10 MG tablet, TAKE 1 TABLET BY MOUTH EVERY NIGHT AS NEEDED, Disp: , Rfl:     baclofen (LIORESAL) 10 MG tablet, ,  Disp: , Rfl:     Calcium Carb-Cholecalciferol ([employer]SC[employer] 500 + D) 500-5 MG-MCG tablet per tablet, Take 1 tablet by mouth 2 (Two) Times a Day., Disp: 180 tablet, Rfl: 3    Cholecalciferol (Vitamin D3) 50 MCG (2000 UT) tablet, TAKE 1 TABLET BY MOUTH EVERY DAY, Disp: 90 tablet, Rfl: 3    [employer] Acid Controller 10 MG tablet, , Disp: , Rfl:     Cyanocobalamin (Vitamin B-12) 1000 MCG sublingual tablet, Place 1 tablet under the tongue Daily., Disp: 100 each, Rfl: 4    cyclobenzaprine (FLEXERIL) 10 MG tablet, Take 1 tablet 3 times a day by oral route., Disp: , Rfl:     famotidine (PEPCID) 20 MG tablet, Take 1 tablet by mouth 2 (Two) Times a Day., Disp: , Rfl:     levothyroxine (SYNTHROID, LEVOTHROID) 175 MCG tablet, TAKE 1 TABLET BY MOUTH EVERY DAY, Disp: 30 tablet, Rfl: 3    liothyronine (CYTOMEL) 5 MCG tablet, Take 1 tablet by mouth Daily., Disp: 30 tablet, Rfl: 11    methylPREDNISolone (MEDROL) 4 MG dose pack, , Disp: , Rfl:     Norco  MG per tablet, 1 Tab, Oral, TID, 0 Refill(s), Disp: , Rfl:     ondansetron ODT (Zofran ODT) 4 MG disintegrating tablet, Place 1 tablet on the tongue Every 8 (Eight) Hours As Needed for Nausea or Vomiting., Disp: 12 tablet, Rfl: 0    promethazine (PHENERGAN) 12.5 MG tablet, TAKE 1 TABLET BY MOUTH EVERY 6 HOURS AS NEEDED FOR NAUSEA AND VOMITING FOR UP TO 7 DAYS, Disp: , Rfl:     HYDROcodone-acetaminophen (NORCO) 7.5-325 MG per tablet, Take 1 tablet by mouth 3 (Three) Times a Day As Needed. for pain  pt states 10/325 (Patient not taking: Reported on 5/30/2025), Disp: , Rfl:           Assessment and plan:  1.  Hypothyroidism: Well-controlled with normal TSH and free T4, continue levothyroxine at 175 mcg p.o. daily with liothyronine 5 mcg p.o. daily and follow labs.    2. Thyroid cancer: Status post thyroid surgery, she had a 5 mm PTC, margins were uninvolved and she did not receive radioactive iodine treatment as she was deemed to be low risk.  TG remains low at 0.5.    3. Hypocalcemia:  Controlled with mild low calcium, recommend to take calcium and vitamin D on regular basis.      Assessment and plan from February 21, 2025 reviewed and updated.           Jeronimo Poe MD FACE.

## 2025-05-30 NOTE — PATIENT INSTRUCTIONS
Continue current dose of levothyroxine  Continue current dose of liothyronine  Follow-up in 6 months with labs.

## 2025-06-19 DIAGNOSIS — C73 THYROID CANCER: ICD-10-CM

## 2025-06-19 RX ORDER — LEVOTHYROXINE SODIUM 150 UG/1
150 TABLET ORAL DAILY
Qty: 90 TABLET | Refills: 1 | OUTPATIENT
Start: 2025-06-19

## (undated) DEVICE — PK ENT MAJ 40

## (undated) DEVICE — SUT VIC 3/0 TIES 18IN J110T

## (undated) DEVICE — STPLR SKIN VISISTAT WD 35CT

## (undated) DEVICE — IRRIGATOR BULB ASEPTO 60CC STRL

## (undated) DEVICE — SUT VIC 3/0 CT2 27IN J232H

## (undated) DEVICE — TOWEL,OR,DSP,ST,BLUE,STD,4/PK,20PK/CS: Brand: MEDLINE

## (undated) DEVICE — ELECTRD BLD EDGE/INSUL1P 2.4X5.1MM STRL

## (undated) DEVICE — SKIN PREP TRAY W/CHG: Brand: MEDLINE INDUSTRIES, INC.

## (undated) DEVICE — DRAPE,REIN 53X77,STERILE: Brand: MEDLINE

## (undated) DEVICE — ADHS SKIN DERMABOND TOP ADVANCED

## (undated) DEVICE — DRSNG TELFA PAD NONADH STR 1S 3X4IN

## (undated) DEVICE — SUT VIC 5/0 PS2 18IN J495H

## (undated) DEVICE — GLV SURG BIOGEL LTX PF 6 1/2

## (undated) DEVICE — NDL HYPO ECLPS SFTY 22G 1 1/2IN

## (undated) DEVICE — MAGNETIC DRAPE: Brand: DEVON

## (undated) DEVICE — SUT VIC 2/0 TIES 18IN J111T

## (undated) DEVICE — DISPOSABLE BIPOLAR FORCEPS 4" (10.2CM) JEWELERS, STRAIGHT 0.4MM TIP AND 12 FT. (3.6M) CABLE: Brand: KIRWAN